# Patient Record
Sex: MALE | Race: BLACK OR AFRICAN AMERICAN | NOT HISPANIC OR LATINO
[De-identification: names, ages, dates, MRNs, and addresses within clinical notes are randomized per-mention and may not be internally consistent; named-entity substitution may affect disease eponyms.]

---

## 2018-04-05 PROBLEM — Z00.00 ENCOUNTER FOR PREVENTIVE HEALTH EXAMINATION: Status: ACTIVE | Noted: 2018-04-05

## 2018-05-31 ENCOUNTER — APPOINTMENT (OUTPATIENT)
Dept: GASTROENTEROLOGY | Facility: CLINIC | Age: 53
End: 2018-05-31
Payer: MEDICAID

## 2018-05-31 ENCOUNTER — APPOINTMENT (OUTPATIENT)
Dept: GASTROENTEROLOGY | Facility: CLINIC | Age: 53
End: 2018-05-31

## 2018-05-31 VITALS
SYSTOLIC BLOOD PRESSURE: 154 MMHG | HEART RATE: 88 BPM | DIASTOLIC BLOOD PRESSURE: 98 MMHG | RESPIRATION RATE: 18 BRPM | BODY MASS INDEX: 43.1 KG/M2 | TEMPERATURE: 97.7 F | WEIGHT: 291 LBS | HEIGHT: 69 IN

## 2018-05-31 PROCEDURE — 99203 OFFICE O/P NEW LOW 30 MIN: CPT

## 2018-05-31 RX ORDER — LISINOPRIL 10 MG/1
10 TABLET ORAL
Refills: 0 | Status: ACTIVE | COMMUNITY

## 2018-08-16 ENCOUNTER — RX CHANGE (OUTPATIENT)
Age: 53
End: 2018-08-16

## 2018-08-16 DIAGNOSIS — Z12.11 ENCOUNTER FOR SCREENING FOR MALIGNANT NEOPLASM OF COLON: ICD-10-CM

## 2018-08-16 RX ORDER — POLYETHYLENE GLYCOL 3350 17 G/17G
17 POWDER, FOR SOLUTION ORAL
Qty: 238 | Refills: 0 | Status: ACTIVE | COMMUNITY
Start: 2018-08-16 | End: 1900-01-01

## 2019-05-14 ENCOUNTER — INPATIENT (INPATIENT)
Facility: HOSPITAL | Age: 54
LOS: 5 days | Discharge: HOME | End: 2019-05-20
Attending: INTERNAL MEDICINE | Admitting: INTERNAL MEDICINE
Payer: MEDICAID

## 2019-05-14 VITALS
HEART RATE: 71 BPM | RESPIRATION RATE: 20 BRPM | SYSTOLIC BLOOD PRESSURE: 151 MMHG | DIASTOLIC BLOOD PRESSURE: 91 MMHG | OXYGEN SATURATION: 98 % | TEMPERATURE: 98 F

## 2019-05-14 DIAGNOSIS — I48.91 UNSPECIFIED ATRIAL FIBRILLATION: ICD-10-CM

## 2019-05-14 LAB
ALBUMIN SERPL ELPH-MCNC: 4 G/DL — SIGNIFICANT CHANGE UP (ref 3.3–5)
ALP SERPL-CCNC: 52 U/L — SIGNIFICANT CHANGE UP (ref 40–120)
ALT FLD-CCNC: 26 U/L — SIGNIFICANT CHANGE UP (ref 4–41)
ANION GAP SERPL CALC-SCNC: 12 MMO/L — SIGNIFICANT CHANGE UP (ref 7–14)
AST SERPL-CCNC: 28 U/L — SIGNIFICANT CHANGE UP (ref 4–40)
B PERT DNA SPEC QL NAA+PROBE: NOT DETECTED — SIGNIFICANT CHANGE UP
BASE EXCESS BLDV CALC-SCNC: 4.3 MMOL/L — SIGNIFICANT CHANGE UP
BASOPHILS # BLD AUTO: 0.02 K/UL — SIGNIFICANT CHANGE UP (ref 0–0.2)
BASOPHILS NFR BLD AUTO: 0.3 % — SIGNIFICANT CHANGE UP (ref 0–2)
BILIRUB SERPL-MCNC: 0.6 MG/DL — SIGNIFICANT CHANGE UP (ref 0.2–1.2)
BLOOD GAS VENOUS - CREATININE: 1.15 MG/DL — SIGNIFICANT CHANGE UP (ref 0.5–1.3)
BLOOD GAS VENOUS - FIO2: 21 — SIGNIFICANT CHANGE UP
BUN SERPL-MCNC: 14 MG/DL — SIGNIFICANT CHANGE UP (ref 7–23)
C PNEUM DNA SPEC QL NAA+PROBE: NOT DETECTED — SIGNIFICANT CHANGE UP
CALCIUM SERPL-MCNC: 9.6 MG/DL — SIGNIFICANT CHANGE UP (ref 8.4–10.5)
CHLORIDE BLDV-SCNC: 107 MMOL/L — SIGNIFICANT CHANGE UP (ref 96–108)
CHLORIDE SERPL-SCNC: 105 MMOL/L — SIGNIFICANT CHANGE UP (ref 98–107)
CO2 SERPL-SCNC: 26 MMOL/L — SIGNIFICANT CHANGE UP (ref 22–31)
CREAT SERPL-MCNC: 1.1 MG/DL — SIGNIFICANT CHANGE UP (ref 0.5–1.3)
EOSINOPHIL # BLD AUTO: 0.03 K/UL — SIGNIFICANT CHANGE UP (ref 0–0.5)
EOSINOPHIL NFR BLD AUTO: 0.4 % — SIGNIFICANT CHANGE UP (ref 0–6)
FLUAV H1 2009 PAND RNA SPEC QL NAA+PROBE: NOT DETECTED — SIGNIFICANT CHANGE UP
FLUAV H1 RNA SPEC QL NAA+PROBE: NOT DETECTED — SIGNIFICANT CHANGE UP
FLUAV H3 RNA SPEC QL NAA+PROBE: NOT DETECTED — SIGNIFICANT CHANGE UP
FLUAV SUBTYP SPEC NAA+PROBE: NOT DETECTED — SIGNIFICANT CHANGE UP
FLUBV RNA SPEC QL NAA+PROBE: NOT DETECTED — SIGNIFICANT CHANGE UP
GAS PNL BLDV: 142 MMOL/L — SIGNIFICANT CHANGE UP (ref 136–146)
GLUCOSE BLDV-MCNC: 101 MG/DL — HIGH (ref 70–99)
GLUCOSE SERPL-MCNC: 101 MG/DL — HIGH (ref 70–99)
HADV DNA SPEC QL NAA+PROBE: NOT DETECTED — SIGNIFICANT CHANGE UP
HCO3 BLDV-SCNC: 26 MMOL/L — SIGNIFICANT CHANGE UP (ref 20–27)
HCOV PNL SPEC NAA+PROBE: SIGNIFICANT CHANGE UP
HCT VFR BLD CALC: 41.7 % — SIGNIFICANT CHANGE UP (ref 39–50)
HCT VFR BLDV CALC: 41.5 % — SIGNIFICANT CHANGE UP (ref 39–51)
HGB BLD-MCNC: 13 G/DL — SIGNIFICANT CHANGE UP (ref 13–17)
HGB BLDV-MCNC: 13.5 G/DL — SIGNIFICANT CHANGE UP (ref 13–17)
HMPV RNA SPEC QL NAA+PROBE: NOT DETECTED — SIGNIFICANT CHANGE UP
HPIV1 RNA SPEC QL NAA+PROBE: NOT DETECTED — SIGNIFICANT CHANGE UP
HPIV2 RNA SPEC QL NAA+PROBE: NOT DETECTED — SIGNIFICANT CHANGE UP
HPIV3 RNA SPEC QL NAA+PROBE: NOT DETECTED — SIGNIFICANT CHANGE UP
HPIV4 RNA SPEC QL NAA+PROBE: NOT DETECTED — SIGNIFICANT CHANGE UP
IMM GRANULOCYTES NFR BLD AUTO: 0.4 % — SIGNIFICANT CHANGE UP (ref 0–1.5)
LACTATE BLDV-MCNC: 1.6 MMOL/L — SIGNIFICANT CHANGE UP (ref 0.5–2)
LYMPHOCYTES # BLD AUTO: 2.32 K/UL — SIGNIFICANT CHANGE UP (ref 1–3.3)
LYMPHOCYTES # BLD AUTO: 30.5 % — SIGNIFICANT CHANGE UP (ref 13–44)
MCHC RBC-ENTMCNC: 26.6 PG — LOW (ref 27–34)
MCHC RBC-ENTMCNC: 31.2 % — LOW (ref 32–36)
MCV RBC AUTO: 85.3 FL — SIGNIFICANT CHANGE UP (ref 80–100)
MONOCYTES # BLD AUTO: 0.92 K/UL — HIGH (ref 0–0.9)
MONOCYTES NFR BLD AUTO: 12.1 % — SIGNIFICANT CHANGE UP (ref 2–14)
NEUTROPHILS # BLD AUTO: 4.29 K/UL — SIGNIFICANT CHANGE UP (ref 1.8–7.4)
NEUTROPHILS NFR BLD AUTO: 56.3 % — SIGNIFICANT CHANGE UP (ref 43–77)
NRBC # FLD: 0 K/UL — SIGNIFICANT CHANGE UP (ref 0–0)
PCO2 BLDV: 52 MMHG — HIGH (ref 41–51)
PH BLDV: 7.37 PH — SIGNIFICANT CHANGE UP (ref 7.32–7.43)
PLATELET # BLD AUTO: 164 K/UL — SIGNIFICANT CHANGE UP (ref 150–400)
PMV BLD: 9.7 FL — SIGNIFICANT CHANGE UP (ref 7–13)
PO2 BLDV: 30 MMHG — LOW (ref 35–40)
POTASSIUM BLDV-SCNC: 4.1 MMOL/L — SIGNIFICANT CHANGE UP (ref 3.4–4.5)
POTASSIUM SERPL-MCNC: 4.3 MMOL/L — SIGNIFICANT CHANGE UP (ref 3.5–5.3)
POTASSIUM SERPL-SCNC: 4.3 MMOL/L — SIGNIFICANT CHANGE UP (ref 3.5–5.3)
PROT SERPL-MCNC: 7.9 G/DL — SIGNIFICANT CHANGE UP (ref 6–8.3)
RBC # BLD: 4.89 M/UL — SIGNIFICANT CHANGE UP (ref 4.2–5.8)
RBC # FLD: 14.6 % — HIGH (ref 10.3–14.5)
RSV RNA SPEC QL NAA+PROBE: NOT DETECTED — SIGNIFICANT CHANGE UP
RV+EV RNA SPEC QL NAA+PROBE: NOT DETECTED — SIGNIFICANT CHANGE UP
SAO2 % BLDV: 49.3 % — LOW (ref 60–85)
SODIUM SERPL-SCNC: 143 MMOL/L — SIGNIFICANT CHANGE UP (ref 135–145)
TROPONIN T, HIGH SENSITIVITY: 44 NG/L — SIGNIFICANT CHANGE UP (ref ?–14)
TROPONIN T, HIGH SENSITIVITY: 47 NG/L — SIGNIFICANT CHANGE UP (ref ?–14)
WBC # BLD: 7.61 K/UL — SIGNIFICANT CHANGE UP (ref 3.8–10.5)
WBC # FLD AUTO: 7.61 K/UL — SIGNIFICANT CHANGE UP (ref 3.8–10.5)

## 2019-05-14 PROCEDURE — 71046 X-RAY EXAM CHEST 2 VIEWS: CPT | Mod: 26

## 2019-05-14 RX ORDER — ENOXAPARIN SODIUM 100 MG/ML
130 INJECTION SUBCUTANEOUS EVERY 12 HOURS
Refills: 0 | Status: DISCONTINUED | OUTPATIENT
Start: 2019-05-14 | End: 2019-05-15

## 2019-05-14 RX ORDER — IPRATROPIUM/ALBUTEROL SULFATE 18-103MCG
3 AEROSOL WITH ADAPTER (GRAM) INHALATION ONCE
Refills: 0 | Status: COMPLETED | OUTPATIENT
Start: 2019-05-14 | End: 2019-05-14

## 2019-05-14 RX ADMIN — ENOXAPARIN SODIUM 130 MILLIGRAM(S): 100 INJECTION SUBCUTANEOUS at 21:13

## 2019-05-14 RX ADMIN — Medication 3 MILLILITER(S): at 16:59

## 2019-05-14 NOTE — ED PROVIDER NOTE - ATTENDING CONTRIBUTION TO CARE
Gong: I have seen and examined the patient face to face, have reviewed and addended the HPI, PE and a/p as necessary.     54 year old HTN HLD obesity sent from [facility] for worsening shortness of breath.  Patient arrives with ekg showing atrial fibrillation.  Pt reports having increasing sob on exertion with productive cough.  Pt states this is all because of his allergies.  Reports now only able to tolerate walking 15 feet /10 steps before getting short of breath. Denies chest pain.  Reports having phlegm.  No family hx of MI. No recent travel, surgeries, hospitalizations, le edema, calf pain, hx of dvt/pe. Last cardiac work up many years ago. Former smoker. No fever, chills, cp, palpitations, abd pain, n/v/d, dysuria.   As per [facility] documentation- no hx of a-fib, ekg from office with a-fib.    GEN - NAD; well appearing; A+O x3; non-toxic appearing, no respiratory distress at rest.    CARD - irregular rhythm, rate controlled no M,G,R;   PULM - slight wheeze, symmetric breath sounds;   ABD:  +BS, ND, NT, soft, no guarding, no rebound, no masses;   BACK: no CVA tenderness, Normal  spine;   EXT: symmetric pulses, 2+ dp, capillary refill < 2 seconds, no clubbing, no cyanosis, no edema

## 2019-05-14 NOTE — ED ADULT NURSE NOTE - OBJECTIVE STATEMENT
patient received in bed . Alert & ox3. cardiac monitor placed.pt complains of shortness  of breath pt . evaluated by MD.No complains of chest  pain or abdominal pain noted at this time. Placed 20g angiocath Lt. AC., labs drawn and sent. Duoneb given as per order. patient will be waiting for results, further evaluation and disposition.  made comfortable.will continue to monitor.

## 2019-05-14 NOTE — ED PROVIDER NOTE - OBJECTIVE STATEMENT
55 y/o male with pmhx of HTN, HLD, obesity, sent from [facility] not sure exactly why. Pt c/o SUMMERS and productive cough x 5 days. Associated with nasal congestion, states his allergies are worse, his wife was also sick with similar symptoms 2 weeks ago. Ambulates with cane at baseline, unable to walk more than 10 steps without getting SOB. Arrived via ambulance. Denies hx of heart problems. No family hx of MI. No recent travel, surgeries, hospitalizations, le edema, calf pain, hx of dvt/pe. Last cardiac work up many years ago. Former smoker. No fever, chills, cp, palpitations, abd pain, n/v/d, dysuria.   As per [facility] documentation- no hx of a-fib, ekg from office with a-fib.

## 2019-05-14 NOTE — ED ADULT TRIAGE NOTE - CHIEF COMPLAINT QUOTE
From [facility] by [facility] with SOB x 3 with non prod. cough. denies CP/dizziness no fever. pt walks with cane.

## 2019-05-14 NOTE — ED PROVIDER NOTE - PROGRESS NOTE DETAILS
left message for Dr. Clemente Vale for admission at [phone] spoke with dr. lamar tele doc, suggesting to admit to Dr. Samia Zeng (will let him know), also suggesting lovenox for ppx for new onset afib. weight documented. spoke with pharmacy regarding dosing. tele PA paged

## 2019-05-14 NOTE — ED PROVIDER NOTE - CLINICAL SUMMARY MEDICAL DECISION MAKING FREE TEXT BOX
55 y/o male with pmhx of HTN, HLD, obesity, sent from [facility] for new onset a-fib, rate controlled. Pt c/o nasal congestion, SUMMERS and productive cough x 5 days. wheezing on exam. sick contact at home. possible onset of afib secondary to viral syndrome- admit for cardiac work up. labs, troponin, proBNP. cxr.

## 2019-05-15 DIAGNOSIS — R91.1 SOLITARY PULMONARY NODULE: ICD-10-CM

## 2019-05-15 DIAGNOSIS — R06.09 OTHER FORMS OF DYSPNEA: ICD-10-CM

## 2019-05-15 DIAGNOSIS — I26.99 OTHER PULMONARY EMBOLISM WITHOUT ACUTE COR PULMONALE: ICD-10-CM

## 2019-05-15 DIAGNOSIS — I10 ESSENTIAL (PRIMARY) HYPERTENSION: ICD-10-CM

## 2019-05-15 DIAGNOSIS — Z29.9 ENCOUNTER FOR PROPHYLACTIC MEASURES, UNSPECIFIED: ICD-10-CM

## 2019-05-15 DIAGNOSIS — E78.5 HYPERLIPIDEMIA, UNSPECIFIED: ICD-10-CM

## 2019-05-15 DIAGNOSIS — D50.9 IRON DEFICIENCY ANEMIA, UNSPECIFIED: ICD-10-CM

## 2019-05-15 DIAGNOSIS — I48.91 UNSPECIFIED ATRIAL FIBRILLATION: ICD-10-CM

## 2019-05-15 LAB
ANION GAP SERPL CALC-SCNC: 13 MMO/L — SIGNIFICANT CHANGE UP (ref 7–14)
APTT BLD: 37.9 SEC — HIGH (ref 27.5–36.3)
BUN SERPL-MCNC: 12 MG/DL — SIGNIFICANT CHANGE UP (ref 7–23)
CALCIUM SERPL-MCNC: 9.5 MG/DL — SIGNIFICANT CHANGE UP (ref 8.4–10.5)
CHLORIDE SERPL-SCNC: 103 MMOL/L — SIGNIFICANT CHANGE UP (ref 98–107)
CHOLEST SERPL-MCNC: 154 MG/DL — SIGNIFICANT CHANGE UP (ref 120–199)
CO2 SERPL-SCNC: 25 MMOL/L — SIGNIFICANT CHANGE UP (ref 22–31)
CREAT SERPL-MCNC: 1.04 MG/DL — SIGNIFICANT CHANGE UP (ref 0.5–1.3)
GLUCOSE SERPL-MCNC: 97 MG/DL — SIGNIFICANT CHANGE UP (ref 70–99)
HBA1C BLD-MCNC: 5.9 % — HIGH (ref 4–5.6)
HCT VFR BLD CALC: 43.6 % — SIGNIFICANT CHANGE UP (ref 39–50)
HDLC SERPL-MCNC: 37 MG/DL — SIGNIFICANT CHANGE UP (ref 35–55)
HGB BLD-MCNC: 13.2 G/DL — SIGNIFICANT CHANGE UP (ref 13–17)
INR BLD: 1.36 — HIGH (ref 0.88–1.17)
LIPID PNL WITH DIRECT LDL SERPL: 109 MG/DL — SIGNIFICANT CHANGE UP
MAGNESIUM SERPL-MCNC: 2.2 MG/DL — SIGNIFICANT CHANGE UP (ref 1.6–2.6)
MCHC RBC-ENTMCNC: 26.1 PG — LOW (ref 27–34)
MCHC RBC-ENTMCNC: 30.3 % — LOW (ref 32–36)
MCV RBC AUTO: 86.3 FL — SIGNIFICANT CHANGE UP (ref 80–100)
NRBC # FLD: 0 K/UL — SIGNIFICANT CHANGE UP (ref 0–0)
NT-PROBNP SERPL-SCNC: 2024 PG/ML — SIGNIFICANT CHANGE UP
PLATELET # BLD AUTO: 161 K/UL — SIGNIFICANT CHANGE UP (ref 150–400)
PMV BLD: 9.6 FL — SIGNIFICANT CHANGE UP (ref 7–13)
POTASSIUM SERPL-MCNC: 4 MMOL/L — SIGNIFICANT CHANGE UP (ref 3.5–5.3)
POTASSIUM SERPL-SCNC: 4 MMOL/L — SIGNIFICANT CHANGE UP (ref 3.5–5.3)
PROTHROM AB SERPL-ACNC: 15.2 SEC — HIGH (ref 9.8–13.1)
RBC # BLD: 5.05 M/UL — SIGNIFICANT CHANGE UP (ref 4.2–5.8)
RBC # FLD: 14.6 % — HIGH (ref 10.3–14.5)
SODIUM SERPL-SCNC: 141 MMOL/L — SIGNIFICANT CHANGE UP (ref 135–145)
TRIGL SERPL-MCNC: 99 MG/DL — SIGNIFICANT CHANGE UP (ref 10–149)
WBC # BLD: 7.18 K/UL — SIGNIFICANT CHANGE UP (ref 3.8–10.5)
WBC # FLD AUTO: 7.18 K/UL — SIGNIFICANT CHANGE UP (ref 3.8–10.5)

## 2019-05-15 PROCEDURE — 93970 EXTREMITY STUDY: CPT | Mod: 26

## 2019-05-15 PROCEDURE — 71275 CT ANGIOGRAPHY CHEST: CPT | Mod: 26

## 2019-05-15 PROCEDURE — 93306 TTE W/DOPPLER COMPLETE: CPT | Mod: 26

## 2019-05-15 RX ORDER — LISINOPRIL 2.5 MG/1
10 TABLET ORAL DAILY
Refills: 0 | Status: DISCONTINUED | OUTPATIENT
Start: 2019-05-15 | End: 2019-05-20

## 2019-05-15 RX ORDER — ATORVASTATIN CALCIUM 80 MG/1
1 TABLET, FILM COATED ORAL
Qty: 0 | Refills: 0 | DISCHARGE

## 2019-05-15 RX ORDER — FUROSEMIDE 40 MG
20 TABLET ORAL DAILY
Refills: 0 | Status: DISCONTINUED | OUTPATIENT
Start: 2019-05-15 | End: 2019-05-16

## 2019-05-15 RX ORDER — FERROUS GLUCONATE 100 %
1 POWDER (GRAM) MISCELLANEOUS
Qty: 0 | Refills: 0 | DISCHARGE

## 2019-05-15 RX ORDER — CARVEDILOL PHOSPHATE 80 MG/1
3.12 CAPSULE, EXTENDED RELEASE ORAL EVERY 12 HOURS
Refills: 0 | Status: DISCONTINUED | OUTPATIENT
Start: 2019-05-15 | End: 2019-05-20

## 2019-05-15 RX ORDER — ATORVASTATIN CALCIUM 80 MG/1
20 TABLET, FILM COATED ORAL AT BEDTIME
Refills: 0 | Status: DISCONTINUED | OUTPATIENT
Start: 2019-05-15 | End: 2019-05-20

## 2019-05-15 RX ORDER — ENOXAPARIN SODIUM 100 MG/ML
130 INJECTION SUBCUTANEOUS
Refills: 0 | Status: DISCONTINUED | OUTPATIENT
Start: 2019-05-15 | End: 2019-05-18

## 2019-05-15 RX ORDER — FERROUS SULFATE 325(65) MG
325 TABLET ORAL DAILY
Refills: 0 | Status: DISCONTINUED | OUTPATIENT
Start: 2019-05-15 | End: 2019-05-20

## 2019-05-15 RX ORDER — ENOXAPARIN SODIUM 100 MG/ML
40 INJECTION SUBCUTANEOUS DAILY
Refills: 0 | Status: DISCONTINUED | OUTPATIENT
Start: 2019-05-15 | End: 2019-05-15

## 2019-05-15 RX ORDER — ACETAMINOPHEN 500 MG
650 TABLET ORAL EVERY 6 HOURS
Refills: 0 | Status: DISCONTINUED | OUTPATIENT
Start: 2019-05-15 | End: 2019-05-20

## 2019-05-15 RX ORDER — ASPIRIN/CALCIUM CARB/MAGNESIUM 324 MG
81 TABLET ORAL DAILY
Refills: 0 | Status: DISCONTINUED | OUTPATIENT
Start: 2019-05-15 | End: 2019-05-20

## 2019-05-15 RX ORDER — LISINOPRIL 2.5 MG/1
1 TABLET ORAL
Qty: 0 | Refills: 0 | DISCHARGE

## 2019-05-15 RX ORDER — FERROUS SULFATE 325(65) MG
1 TABLET ORAL
Qty: 0 | Refills: 0 | DISCHARGE

## 2019-05-15 RX ADMIN — ATORVASTATIN CALCIUM 20 MILLIGRAM(S): 80 TABLET, FILM COATED ORAL at 21:30

## 2019-05-15 RX ADMIN — CARVEDILOL PHOSPHATE 3.12 MILLIGRAM(S): 80 CAPSULE, EXTENDED RELEASE ORAL at 17:03

## 2019-05-15 RX ADMIN — Medication 81 MILLIGRAM(S): at 14:26

## 2019-05-15 RX ADMIN — LISINOPRIL 10 MILLIGRAM(S): 2.5 TABLET ORAL at 14:26

## 2019-05-15 RX ADMIN — Medication 325 MILLIGRAM(S): at 14:26

## 2019-05-15 RX ADMIN — ENOXAPARIN SODIUM 130 MILLIGRAM(S): 100 INJECTION SUBCUTANEOUS at 17:03

## 2019-05-15 NOTE — CONSULT NOTE ADULT - ASSESSMENT
SOB   has mild cough  SUMMERS  ? acute CHF vs pulm pathology   obtain CTPA to rule out PE and eval lung parenchyma   obtain echo  check proBNP    HTN  narrow pulse pressure may suggest decline in EF   start low dose BB and lasix 20 daily for now   obtain ECHO    abnormal EKG  not suggestive of afib  sinus with APCS  dc a/c   echo

## 2019-05-15 NOTE — H&P ADULT - RS GEN PE MLT RESP DETAILS PC
respirations non-labored/airway patent/breath sounds equal/no chest wall tenderness/good air movement/wheezes

## 2019-05-15 NOTE — H&P ADULT - NSHPSOCIALHISTORY_GEN_ALL_CORE
Marital Status:     Occupation: Retired, was self employed    Tobacco Use: d/c smoking 10 years ago, used to smoke 1 pack per day x 5 years    ETOH Use: neg    Flu Vaccine:       neg                           Pneumonia Vaccine: neg

## 2019-05-15 NOTE — H&P ADULT - PROBLEM SELECTOR PLAN 1
EKG/Telemetry, f/u ce x 2, mg, tsh, echo, consider ischemic w/u, cardio c/s Dr. Vale, RVP neg, check a probnp. start Lasix 20 iv daily as per Dr. Vale

## 2019-05-15 NOTE — H&P ADULT - ASSESSMENT
55 y/o male, with a PmHx of HTN, HLD, Anemia, Obesity, presented to the [facility] ED with SUMMERS and productive cough x  5 days. Admitted to telemetry for r/o acs.

## 2019-05-15 NOTE — CHART NOTE - NSCHARTNOTEFT_GEN_A_CORE
Tele PA Note    Called by Radiologist for patient having a bilateral PE with evidence of right heart strain on CTA chest. Findings were discussed with Dr. Vale. Pt placed back on therapeutic Lovenox. B/L LE US put in as well. Called the Echo department to expedite the echo. Findings were also discussed with patient. Will follow closely. He appears comfortable at this time.    Leonides Morales PA-C  Tele PA   [phone]

## 2019-05-15 NOTE — CONSULT NOTE ADULT - ASSESSMENT
55 y/o male, with a PmHx of HTN, HLD, Anemia, Obesity, presented to the [facility] ED with SUMMERS and productive cough x  5 days. Admitted to telemetry for r/o acs.

## 2019-05-15 NOTE — H&P ADULT - HISTORY OF PRESENT ILLNESS
53 y/o male, with a PmHx of HTN, HLD, Anemia, Obesity, presented to the [facility] ED with SUMMERS and productive cough x  5 days. Pt states about 5 days ago he started to get nasal congestion, cough, sinus pressure and was feeling tired. He states his wife was sick 2 weeks ago with similar symptoms so he thought he was getting what she had. After a few days of having these symptoms, he had gone to his PCP's office yesterday for an evaluation. While in his PCP's office, an EKG was done and appeared to be in new onset rate controlled afib so he was sent to the ed for an evaluation. He denies any fever, chills, chest pain, HA, blurred vision, n/v, abd pain, recent travel. He stated he was also having some dizziness. While in the ED, pt was started on therapeutic Lovenox x 1 dose and then admitted to telemetry for ? new Afib. While still in the ED, pt was seen by Dr. Vale (cardiologist) and determined the EKG was not Afib but was NSR with frequent PAC's and PVC's. Pt appears comfortable at this time.

## 2019-05-15 NOTE — H&P ADULT - PROBLEM SELECTOR PLAN 2
? afib, P waves present and appears to be PVC's and PACs that are frequent and not afib, as per Dr. Vale d/c therapeutic Lovenox (can give the prophylaxis dose instead), start coreg 3.125mg po bid

## 2019-05-15 NOTE — H&P ADULT - NSHPPHYSICALEXAM_GEN_ALL_CORE
Vital Signs Last 24 Hrs  T(C): 36.6 (15 May 2019 07:14), Max: 37.1 (15 May 2019 01:49)  T(F): 97.8 (15 May 2019 07:14), Max: 98.8 (15 May 2019 01:49)  HR: 87 (15 May 2019 07:14) (71 - 101)  BP: 148/110 (15 May 2019 07:14) (104/96 - 163/107)  BP(mean): --  RR: 18 (15 May 2019 07:14) (18 - 20)  SpO2: 100% (15 May 2019 07:14) (95% - 100%)    EKG: NSR w/PAC's & PVC's @ 84

## 2019-05-15 NOTE — H&P ADULT - NEGATIVE OPHTHALMOLOGIC SYMPTOMS
no photophobia/no blurred vision R/no diplopia/no loss of vision R/no blurred vision L/no loss of vision L

## 2019-05-15 NOTE — CONSULT NOTE ADULT - PROBLEM SELECTOR RECOMMENDATION 9
SUMMERS related to PE : started on lovenox full therapeutic dose: There is some evidence of RV strain ct chest: But he is hemodynamically stable: Would check echocardiogram: dopplers ARE PENDING!

## 2019-05-16 LAB
ANION GAP SERPL CALC-SCNC: 13 MMO/L — SIGNIFICANT CHANGE UP (ref 7–14)
APTT BLD: 34.9 SEC — SIGNIFICANT CHANGE UP (ref 27.5–36.3)
BUN SERPL-MCNC: 14 MG/DL — SIGNIFICANT CHANGE UP (ref 7–23)
CALCIUM SERPL-MCNC: 9.2 MG/DL — SIGNIFICANT CHANGE UP (ref 8.4–10.5)
CHLORIDE SERPL-SCNC: 103 MMOL/L — SIGNIFICANT CHANGE UP (ref 98–107)
CO2 SERPL-SCNC: 24 MMOL/L — SIGNIFICANT CHANGE UP (ref 22–31)
CREAT SERPL-MCNC: 1.06 MG/DL — SIGNIFICANT CHANGE UP (ref 0.5–1.3)
GLUCOSE SERPL-MCNC: 101 MG/DL — HIGH (ref 70–99)
HCT VFR BLD CALC: 40.2 % — SIGNIFICANT CHANGE UP (ref 39–50)
HGB BLD-MCNC: 12.3 G/DL — LOW (ref 13–17)
INR BLD: 1.31 — HIGH (ref 0.88–1.17)
MCHC RBC-ENTMCNC: 26.3 PG — LOW (ref 27–34)
MCHC RBC-ENTMCNC: 30.6 % — LOW (ref 32–36)
MCV RBC AUTO: 85.9 FL — SIGNIFICANT CHANGE UP (ref 80–100)
NRBC # FLD: 0 K/UL — SIGNIFICANT CHANGE UP (ref 0–0)
PLATELET # BLD AUTO: 169 K/UL — SIGNIFICANT CHANGE UP (ref 150–400)
PMV BLD: 9.7 FL — SIGNIFICANT CHANGE UP (ref 7–13)
POTASSIUM SERPL-MCNC: 3.8 MMOL/L — SIGNIFICANT CHANGE UP (ref 3.5–5.3)
POTASSIUM SERPL-SCNC: 3.8 MMOL/L — SIGNIFICANT CHANGE UP (ref 3.5–5.3)
PROTHROM AB SERPL-ACNC: 14.7 SEC — HIGH (ref 9.8–13.1)
RBC # BLD: 4.68 M/UL — SIGNIFICANT CHANGE UP (ref 4.2–5.8)
RBC # FLD: 14.6 % — HIGH (ref 10.3–14.5)
SODIUM SERPL-SCNC: 140 MMOL/L — SIGNIFICANT CHANGE UP (ref 135–145)
WBC # BLD: 4.36 K/UL — SIGNIFICANT CHANGE UP (ref 3.8–10.5)
WBC # FLD AUTO: 4.36 K/UL — SIGNIFICANT CHANGE UP (ref 3.8–10.5)

## 2019-05-16 RX ORDER — FUROSEMIDE 40 MG
40 TABLET ORAL
Refills: 0 | Status: DISCONTINUED | OUTPATIENT
Start: 2019-05-16 | End: 2019-05-19

## 2019-05-16 RX ADMIN — ATORVASTATIN CALCIUM 20 MILLIGRAM(S): 80 TABLET, FILM COATED ORAL at 21:09

## 2019-05-16 RX ADMIN — Medication 81 MILLIGRAM(S): at 09:02

## 2019-05-16 RX ADMIN — ENOXAPARIN SODIUM 130 MILLIGRAM(S): 100 INJECTION SUBCUTANEOUS at 17:14

## 2019-05-16 RX ADMIN — LISINOPRIL 10 MILLIGRAM(S): 2.5 TABLET ORAL at 06:18

## 2019-05-16 RX ADMIN — CARVEDILOL PHOSPHATE 3.12 MILLIGRAM(S): 80 CAPSULE, EXTENDED RELEASE ORAL at 17:14

## 2019-05-16 RX ADMIN — ENOXAPARIN SODIUM 130 MILLIGRAM(S): 100 INJECTION SUBCUTANEOUS at 06:17

## 2019-05-16 RX ADMIN — Medication 20 MILLIGRAM(S): at 06:18

## 2019-05-16 RX ADMIN — Medication 325 MILLIGRAM(S): at 09:02

## 2019-05-16 RX ADMIN — CARVEDILOL PHOSPHATE 3.12 MILLIGRAM(S): 80 CAPSULE, EXTENDED RELEASE ORAL at 06:18

## 2019-05-16 RX ADMIN — Medication 40 MILLIGRAM(S): at 17:14

## 2019-05-16 NOTE — CONSULT NOTE ADULT - SUBJECTIVE AND OBJECTIVE BOX
Patient is a 54y old  Male who presents with a chief complaint of SOB/Nasal Congestion (15 May 2019 08:00)      HPI:  55 y/o male, with a PmHx of HTN, HLD, Anemia, Obesity, presented to the [facility] ED with SUMMERS and productive cough x  5 days. Pt states about 5 days ago he started to get nasal congestion, cough, sinus pressure and was feeling tired. He states his wife was sick 2 weeks ago with similar symptoms so he thought he was getting what she had. After a few days of having these symptoms, he had gone to his PCP's office yesterday for an evaluation. While in his PCP's office, an EKG was done and appeared to be in new onset rate controlled afib so he was sent to the ed for an evaluation. He denies any fever, chills, chest pain, HA, blurred vision, n/v, abd pain, recent travel. He stated he was also having some dizziness. While in the ED, pt was started on therapeutic Lovenox x 1 dose and then admitted to telemetry for ? new Afib. While still in the ED, pt was seen by Dr. Vale (cardiologist) and determined the EKG was not Afib but was NSR with frequent PAC's and PVC's. Pt appears comfortable at this time. (15 May 2019 08:00)    events noted: pt says he has no underlying lung disease he does have sinus illness: and in january he was given antibiotics as well as some cough medications for sinus disease: He feels the same symptom in his sinuses now too:    todays he says he feels littel SOB but dneis ahving asthma or emphysema      ?FOLLOWING PRESENT  [x ] Hx of PE/DVT, [ x] Hx COPD, [ x] Hx of Asthma, [ x] Hx of Hospitalization, [x ]  Hx of BiPAP/CPAP use, [ x] Hx of TINA    Allergies    No Known Allergies    Intolerances        PAST MEDICAL & SURGICAL HISTORY:  Iron deficiency anemia  Obesity  Hyperlipidemia  Hypertension  No significant past surgical history      FAMILY HISTORY:  No pertinent family history in first degree relatives      Social History: [ 10 years: quit 28 years ago  ] TOBACCO                  [  x] ETOH                                 [ x ] IVDA/DRUGS    REVIEW OF SYSTEMS      General:	x    Skin/Breast:x  	  Ophthalmologic:x  	  ENMT:	sinus pressure, cough    Respiratory and Thorax:SOB  	  Cardiovascular:	x    Gastrointestinal:	x    Genitourinary:	x    Musculoskeletal:	x    Neurological:	x  Psychiatric:	  x  Hematology/Lymphatics:	x    Endocrine:	x    Allergic/Immunologic:	x    MEDICATIONS  (STANDING):  aspirin enteric coated 81 milliGRAM(s) Oral daily  atorvastatin 20 milliGRAM(s) Oral at bedtime  carvedilol 3.125 milliGRAM(s) Oral every 12 hours  enoxaparin Injectable 130 milliGRAM(s) SubCutaneous two times a day  ferrous    sulfate 325 milliGRAM(s) Oral daily  furosemide   Injectable 20 milliGRAM(s) IV Push daily  lisinopril 10 milliGRAM(s) Oral daily    MEDICATIONS  (PRN):  acetaminophen   Tablet .. 650 milliGRAM(s) Oral every 6 hours PRN Temp greater or equal to 38C (100.4F), Mild Pain (1 - 3), Moderate Pain (4 - 6)       Vital Signs Last 24 Hrs  T(C): 36.9 (15 May 2019 11:30), Max: 37.1 (15 May 2019 01:49)  T(F): 98.5 (15 May 2019 11:30), Max: 98.8 (15 May 2019 01:49)  HR: 85 (15 May 2019 11:30) (71 - 101)  BP: 142/95 (15 May 2019 11:30) (104/96 - 163/107)  BP(mean): --  RR: 18 (15 May 2019 11:30) (18 - 20)  SpO2: 97% (15 May 2019 11:30) (95% - 100%)        I&O's Summary      Physical Exam:   GENERAL: NAD, well-groomed, well-developed  HEENT: FÁTIMA/   Atraumatic, Normocephalic  ENMT: No tonsillar erythema, exudates, or enlargement; Moist mucous membranes, Good dentition, No lesions  NECK: Supple, No JVD, Normal thyroid  CHEST/LUNG: obese  CVS: Regular rate and rhythm; No murmurs, rubs, or gallops  GI: : Soft, Nontender, Nondistended; Bowel sounds present  NERVOUS SYSTEM:  Alert & Oriented X3  EXTREMITIES: -edema  LYMPH: No lymphadenopathy noted  SKIN: No rashes or lesions  ENDOCRINOLOGY: No Thyromegaly  PSYCH: Appropriate    Labs:  4.3<52<4>>30<<7.375>>4.3<<3><<4><<5<<309>>                            13.2   7.18  )-----------( 161      ( 15 May 2019 10:45 )             43.6                         13.0   7.61  )-----------( 164      ( 14 May 2019 17:15 )             41.7     05-14    143  |  105  |  14  ----------------------------<  101<H>  4.3   |  26  |  1.10    Ca    9.6      14 May 2019 17:15    TPro  7.9  /  Alb  4.0  /  TBili  0.6  /  DBili  x   /  AST  28  /  ALT  26  /  AlkPhos  52  05-14    CAPILLARY BLOOD GLUCOSE        LIVER FUNCTIONS - ( 14 May 2019 17:15 )  Alb: 4.0 g/dL / Pro: 7.9 g/dL / ALK PHOS: 52 u/L / ALT: 26 u/L / AST: 28 u/L / GGT: x           PT/INR - ( 15 May 2019 10:45 )   PT: 15.2 SEC;   INR: 1.36          PTT - ( 15 May 2019 10:45 )  PTT:37.9 SEC    D DImer      Studies  Chest X-RAY  CT SCAN Chest   CT Abdomen  Venous Dopplers: LE:   Others      < from: CT Angio Chest w/ IV Cont (05.15.19 @ 09:12) >  FINDINGS:     Pulmonary Artery: The main pulmonary artery is normal in caliber at the   level of bifurcation. Acute pulmonary embolus involving all 5 lobes from   distal bilateral main pulmonary arteries to the subsegmental branches.    A 5 mm pulmonary nodule in the right lower lobe (3:69).    Lungs And Airways: Central airways are patent. The lungs are clear.    Pleura: No pleural effusions or pneumothorax.    Mediastinum: No mediastinal lymphadenopathy. The imaged portions of the   thyroid gland is unremarkable.    Heart and Vasculature:  No pericardial effusion. There is subtle   flattening of the interventricular septum with some prominence of the   right heart chambers concerning for right heart strain.    Upper Abdomen: Small Bochdalek and hiatal hernia.    Bones And Soft Tissues: Degenerative change of the spine.    IMPRESSION:     Acute bilateral pulmonary emboli with suggestion ofright heart strain.    Dr. Montano discussed these findings with AMANDO Brown on 5/15/2019 9:34   AM, with read back.    5 mm right lower lobe pulmonary nodule. A 6-12 month follow-up chest CT   can be performed for further evaluation as clinically warranted.              OC MONTANO M.D., RADIOLOGY RESIDENT  This document has been electronically signed.  BERTO GARCIA M.D. ATTENDING RADIOLOGIST    < end of copied text >
CHIEF COMPLAINT:Patient is a 54y old  Male who presents with a chief complaint of     HISTORY OF PRESENT ILLNESS:    54 male with history as below presents with sob with walking mild cough   no cp   no dizziness  no palpitation     PAST MEDICAL & SURGICAL HISTORY:  Obesity  Hyperlipidemia  Hypertension  No significant past surgical history          MEDICATIONS:  enoxaparin Injectable 130 milliGRAM(s) SubCutaneous every 12 hours        acetaminophen   Tablet .. 650 milliGRAM(s) Oral every 6 hours PRN            FAMILY HISTORY:      Non-contributory    SOCIAL HISTORY:    No tobacco, drugs or etoh    Allergies    No Known Allergies    Intolerances    	    REVIEW OF SYSTEMS:  as above  The rest of the 14 points ROS reviewed and except above they are unremarkable.        PHYSICAL EXAM:  T(C): 36.6 (05-15-19 @ 07:14), Max: 37.1 (05-15-19 @ 01:49)  HR: 87 (05-15-19 @ 07:14) (71 - 101)  BP: 148/110 (05-15-19 @ 07:14) (104/96 - 163/107)  RR: 18 (05-15-19 @ 07:14) (18 - 20)  SpO2: 100% (05-15-19 @ 07:14) (95% - 100%)  Wt(kg): --  I&O's Summary    JVP: Normal  Neck: supple  Lung: few crackles   CV: S1 S2 , Murmur:  Abd: soft  Ext: No edema  neuro: Awake / alert  Psych: flat affect  Skin: normal      LABS/DATA:    TELEMETRY: 	    ECG:  	   	  CARDIAC MARKERS:                                      13.0   7.61  )-----------( 164      ( 14 May 2019 17:15 )             41.7     05-14    143  |  105  |  14  ----------------------------<  101<H>  4.3   |  26  |  1.10    Ca    9.6      14 May 2019 17:15    TPro  7.9  /  Alb  4.0  /  TBili  0.6  /  DBili  x   /  AST  28  /  ALT  26  /  AlkPhos  52  05-14    proBNP:   Lipid Profile:   HgA1c:   TSH:
Patient is a 54y old  Male who presents with a chief complaint of SOB/Nasal Congestion (16 May 2019 13:03)      HPI:  53 y/o male, with a PmHx of HTN, HLD, Anemia, Obesity, presented to the [facility] ED with SUMMERS and productive cough x  5 days. Pt states about 5 days ago he started to get nasal congestion, cough, sinus pressure and was feeling tired. He states his wife was sick 2 weeks ago with similar symptoms so he thought he was getting what she had. After a few days of having these symptoms, he had gone to his PCP's office yesterday for an evaluation. While in his PCP's office, an EKG was done and appeared to be in new onset rate controlled afib so he was sent to the ed for an evaluation. He denies any fever, chills, chest pain, HA, blurred vision, n/v, abd pain, recent travel. He stated he was also having some dizziness. While in the ED, pt was started on therapeutic Lovenox x 1 dose and then admitted to telemetry for ? new Afib. While still in the ED, pt was seen by Dr. Vale (cardiologist) and determined the EKG was not Afib but was NSR with frequent PAC's and PVC's. Pt appears comfortable at this time. (15 May 2019 08:00)       ROS:  Negative except for:    PAST MEDICAL & SURGICAL HISTORY:  Iron deficiency anemia  Obesity  Hyperlipidemia  Hypertension  No significant past surgical history      SOCIAL HISTORY:    FAMILY HISTORY:  No pertinent family history in first degree relatives      MEDICATIONS  (STANDING):  aspirin enteric coated 81 milliGRAM(s) Oral daily  atorvastatin 20 milliGRAM(s) Oral at bedtime  carvedilol 3.125 milliGRAM(s) Oral every 12 hours  enoxaparin Injectable 130 milliGRAM(s) SubCutaneous two times a day  ferrous    sulfate 325 milliGRAM(s) Oral daily  furosemide   Injectable 40 milliGRAM(s) IV Push two times a day  lisinopril 10 milliGRAM(s) Oral daily    MEDICATIONS  (PRN):  acetaminophen   Tablet .. 650 milliGRAM(s) Oral every 6 hours PRN Temp greater or equal to 38C (100.4F), Mild Pain (1 - 3), Moderate Pain (4 - 6)      Allergies    No Known Allergies    Intolerances        Vital Signs Last 24 Hrs  T(C): 36.8 (16 May 2019 06:16), Max: 37.2 (15 May 2019 21:29)  T(F): 98.2 (16 May 2019 06:16), Max: 98.9 (15 May 2019 21:29)  HR: 69 (16 May 2019 06:16) (69 - 85)  BP: 137/91 (16 May 2019 06:16) (135/87 - 144/73)  BP(mean): --  RR: 16 (16 May 2019 06:16) (16 - 17)  SpO2: 97% (16 May 2019 06:16) (97% - 98%)    PHYSICAL EXAM  General: obese male, adult in NAD  HEENT:  anicteric sclera, pink conjunctiva  Neck: supple  CV: normal S1/S2 with no murmur rubs or gallops  Lungs: positive air movement b/l ant lungs,clear to auscultation, no wheezes, no rales  Abdomen: soft non-tender non-distended, no hepatosplenomegaly  Ext: no clubbing cyanosis or edema  Skin: no rashes and no petechiae  Neuro: alert and oriented X 4, no focal deficits      LABS:                          12.3   4.36  )-----------( 169      ( 16 May 2019 07:41 )             40.2         Mean Cell Volume : 85.9 fL  Mean Cell Hemoglobin : 26.3 pg  Mean Cell Hemoglobin Concentration : 30.6 %  Auto Neutrophil # : x  Auto Lymphocyte # : x  Auto Monocyte # : x  Auto Eosinophil # : x  Auto Basophil # : x  Auto Neutrophil % : x  Auto Lymphocyte % : x  Auto Monocyte % : x  Auto Eosinophil % : x  Auto Basophil % : x      05-16    140  |  103  |  14  ----------------------------<  101<H>  3.8   |  24  |  1.06    Ca    9.2      16 May 2019 07:41  Mg     2.2     05-15    TPro  7.9  /  Alb  4.0  /  TBili  0.6  /  DBili  x   /  AST  28  /  ALT  26  /  AlkPhos  52  05-14      PT/INR - ( 16 May 2019 07:41 )   PT: 14.7 SEC;   INR: 1.31          PTT - ( 16 May 2019 07:41 )  PTT:34.9 SEC      BLOOD SMEAR INTERPRETATION:       RADIOLOGY & ADDITIONAL STUDIES:  < from: CT Angio Chest w/ IV Cont (05.15.19 @ 09:12) >  EXAM:  CT ANGIO CHEST (W)AW IC        PROCEDURE DATE:  May 15 2019         INTERPRETATION:  INDICATION: Shortness of breath, cough. Evaluate for   pulmonary embolus.    TECHNIQUE: Unenhanced helical images were obtained of the chest. Coronal   and sagittal images were reconstructed.  Images were obtained after the   uneventful administration of 90 cc of nonionic intravenous contrast   (Omnipaque 350).  10 cc of nonionic intravenous contrast (Omnipaque 350)   was discarded. Maximum intensity projection images were generated.    COMPARISON: No prior CT chest. Chest radiograph from 5/14/2019.    FINDINGS:     Pulmonary Artery: The main pulmonary artery is normal in caliber at the   level of bifurcation. Acute pulmonary embolus involving all 5 lobes from   distal bilateral main pulmonary arteries to the subsegmental branches.    A 5 mm pulmonary nodule in the right lower lobe (3:69).    Lungs And Airways: Central airways are patent. The lungs are clear.    Pleura: No pleural effusions or pneumothorax.    Mediastinum: No mediastinal lymphadenopathy. The imaged portions of the   thyroid gland is unremarkable.    Heart and Vasculature:  No pericardial effusion. There is subtle   flattening of the interventricular septum with some prominence of the   right heart chambers concerning for right heart strain.    Upper Abdomen: Small Bochdalek and hiatal hernia.    Bones And Soft Tissues: Degenerative change of the spine.    IMPRESSION:     Acute bilateral pulmonary emboli with suggestion ofright heart strain.    Dr. Montano discussed these findings with AMANDO Brown on 5/15/2019 9:34   AM, with read back.    5 mm right lower lobe pulmonary nodule. A 6-12 month follow-up chest CT   can be performed for further evaluation as clinically warranted.    < from: [facility] Duplex Ext Veins Lower Comp, Bilat. (05.15.19 @ 12:02) >  Type of Test: Lower Extremity Venous  ------------------------------------------------------------------------  Procedure: Real-time grayscale and color Duplex  ultrasonography was used to interrogate the deep veins of  the bilateral lower extremities.  Indications: Localized swelling, mass and lump, lower  limb, bilateral (R22.43)  ------------------------------------------------------------------------  RESULT:  ------------------------------------------------------------------------  RIGHT:  Deep venous thrombosis: Yes  Superficial venous thrombosis: No  Deep venous insufficiency: No  Superficial venous insufficiency: No  ------------------------------------------------------------------------  LEFT:  Deep venous thrombosis: No  Superficial venous thrombosis: No  Deep venous insufficiency: No  Superficial venous insufficiency: No  ------------------------------------------------------------------------  Right Findings: Non-occlusive thrombosis noted in the  right popliteal vein, which appears dilated with loss of  full compressibility.  The right common femoral, femoral, gastrocnemius,  posterior tibial, and peroneal veins, otherwise appear  sonographically normal and compressible without evidence  of thrombosis.  Left Findings: No evidence of thrombosis in the left lower  extremity.  The left common femoral, femoral, popliteal,  gastrocnemius, posterior tibial, and peroneal veins appear  sonographically normal and compressible without evidence  of thrombosis.  Normal phasic Doppler waveforms notedin the left common  femoral vein.  No evidence of superficial vein thrombosis.  The left  great saphenous and short saphenous veins are patent, and  demonstrate full compressibility.  No evidence of deep and superficial venous insufficiency  in the left lower extremity.  Normal valve closure times  (VCT) with proximal and distal augmentation noted within  the common femoral, femoral, popliteal, great saphenous,  and short saphenous veins.  ------------------------------------------------------------------------  Summary/Impressions:  Non-occlusive deep vein thrombosis noted in the right  popliteal vein.  Results communicated with AMANDO Morales on 05/15/2019 at  12:45 pm with read back.  ------------------------------------------------------------------------  Confirmed on  5/15/2019 - 12:57 PM by Mark Lanier MD,    < end of copied text >    <

## 2019-05-16 NOTE — CONSULT NOTE ADULT - ASSESSMENT
55 yo obese black male with new pulmonary emboli and DVT. No clear precipitating factor although obesity would be a risk factor. No family hx of clotting or bleeding events. Will proceed with a hypercoagulable work up. 55 yo obese black male with new pulmonary emboli and DVT. No clear precipitating factor although obesity would be a risk factor. No family hx of clotting or bleeding events. Will proceed with a hypercoagulable work up.    Considering a nodule in lung noted on chest CT, would obtain an abdominal CT , check PSA and CEA to r/o an occult malignancy.

## 2019-05-17 LAB
ANION GAP SERPL CALC-SCNC: 14 MMO/L — SIGNIFICANT CHANGE UP (ref 7–14)
APTT BLD: 44.3 SEC — HIGH (ref 27.5–36.3)
AT III ACT/NOR PPP CHRO: 82 % — SIGNIFICANT CHANGE UP (ref 76–140)
BASOPHILS # BLD AUTO: 0.03 K/UL — SIGNIFICANT CHANGE UP (ref 0–0.2)
BASOPHILS NFR BLD AUTO: 0.6 % — SIGNIFICANT CHANGE UP (ref 0–2)
BUN SERPL-MCNC: 20 MG/DL — SIGNIFICANT CHANGE UP (ref 7–23)
CALCIUM SERPL-MCNC: 9.3 MG/DL — SIGNIFICANT CHANGE UP (ref 8.4–10.5)
CEA SERPL-MCNC: 2.8 NG/ML — SIGNIFICANT CHANGE UP (ref 1–3.8)
CHLORIDE SERPL-SCNC: 101 MMOL/L — SIGNIFICANT CHANGE UP (ref 98–107)
CO2 SERPL-SCNC: 24 MMOL/L — SIGNIFICANT CHANGE UP (ref 22–31)
CREAT SERPL-MCNC: 1.16 MG/DL — SIGNIFICANT CHANGE UP (ref 0.5–1.3)
DRVVT SCREEN TO CONFIRM RATIO: 0.99 — SIGNIFICANT CHANGE UP (ref 0–1.2)
EOSINOPHIL # BLD AUTO: 0.1 K/UL — SIGNIFICANT CHANGE UP (ref 0–0.5)
EOSINOPHIL NFR BLD AUTO: 2 % — SIGNIFICANT CHANGE UP (ref 0–6)
GLUCOSE SERPL-MCNC: 105 MG/DL — HIGH (ref 70–99)
HCT VFR BLD CALC: 42.3 % — SIGNIFICANT CHANGE UP (ref 39–50)
HCYS SERPL-MCNC: 17.6 UMOL/L — HIGH
HGB BLD-MCNC: 13.3 G/DL — SIGNIFICANT CHANGE UP (ref 13–17)
IMM GRANULOCYTES NFR BLD AUTO: 0.6 % — SIGNIFICANT CHANGE UP (ref 0–1.5)
INR BLD: 1.22 — HIGH (ref 0.88–1.17)
LYMPHOCYTES # BLD AUTO: 1.92 K/UL — SIGNIFICANT CHANGE UP (ref 1–3.3)
LYMPHOCYTES # BLD AUTO: 38.9 % — SIGNIFICANT CHANGE UP (ref 13–44)
MAGNESIUM SERPL-MCNC: 1.9 MG/DL — SIGNIFICANT CHANGE UP (ref 1.6–2.6)
MCHC RBC-ENTMCNC: 26.9 PG — LOW (ref 27–34)
MCHC RBC-ENTMCNC: 31.4 % — LOW (ref 32–36)
MCV RBC AUTO: 85.6 FL — SIGNIFICANT CHANGE UP (ref 80–100)
MONOCYTES # BLD AUTO: 0.7 K/UL — SIGNIFICANT CHANGE UP (ref 0–0.9)
MONOCYTES NFR BLD AUTO: 14.2 % — HIGH (ref 2–14)
NEUTROPHILS # BLD AUTO: 2.16 K/UL — SIGNIFICANT CHANGE UP (ref 1.8–7.4)
NEUTROPHILS NFR BLD AUTO: 43.7 % — SIGNIFICANT CHANGE UP (ref 43–77)
NORMALIZED SCT PPP-RTO: 1.02 — SIGNIFICANT CHANGE UP (ref 0.85–1.33)
NRBC # FLD: 0 K/UL — SIGNIFICANT CHANGE UP (ref 0–0)
PLATELET # BLD AUTO: 176 K/UL — SIGNIFICANT CHANGE UP (ref 150–400)
PMV BLD: 10.2 FL — SIGNIFICANT CHANGE UP (ref 7–13)
POTASSIUM SERPL-MCNC: 3.9 MMOL/L — SIGNIFICANT CHANGE UP (ref 3.5–5.3)
POTASSIUM SERPL-SCNC: 3.9 MMOL/L — SIGNIFICANT CHANGE UP (ref 3.5–5.3)
PROT C ACT/NOR PPP: 84 % — SIGNIFICANT CHANGE UP (ref 74–150)
PROTHROM AB SERPL-ACNC: 14 SEC — HIGH (ref 9.8–13.1)
PSA FLD-MCNC: 0.99 NG/ML — SIGNIFICANT CHANGE UP (ref 0–4)
RBC # BLD: 4.94 M/UL — SIGNIFICANT CHANGE UP (ref 4.2–5.8)
RBC # FLD: 14.5 % — SIGNIFICANT CHANGE UP (ref 10.3–14.5)
SODIUM SERPL-SCNC: 139 MMOL/L — SIGNIFICANT CHANGE UP (ref 135–145)
THROMBIN TIME: 91.7 SEC — HIGH (ref 16–25)
WBC # BLD: 4.94 K/UL — SIGNIFICANT CHANGE UP (ref 3.8–10.5)
WBC # FLD AUTO: 4.94 K/UL — SIGNIFICANT CHANGE UP (ref 3.8–10.5)

## 2019-05-17 PROCEDURE — 74177 CT ABD & PELVIS W/CONTRAST: CPT | Mod: 26

## 2019-05-17 PROCEDURE — G0452: CPT | Mod: 26

## 2019-05-17 RX ADMIN — Medication 81 MILLIGRAM(S): at 13:02

## 2019-05-17 RX ADMIN — ATORVASTATIN CALCIUM 20 MILLIGRAM(S): 80 TABLET, FILM COATED ORAL at 22:37

## 2019-05-17 RX ADMIN — CARVEDILOL PHOSPHATE 3.12 MILLIGRAM(S): 80 CAPSULE, EXTENDED RELEASE ORAL at 17:20

## 2019-05-17 RX ADMIN — Medication 40 MILLIGRAM(S): at 17:18

## 2019-05-17 RX ADMIN — ENOXAPARIN SODIUM 130 MILLIGRAM(S): 100 INJECTION SUBCUTANEOUS at 17:21

## 2019-05-17 RX ADMIN — Medication 40 MILLIGRAM(S): at 05:08

## 2019-05-17 RX ADMIN — CARVEDILOL PHOSPHATE 3.12 MILLIGRAM(S): 80 CAPSULE, EXTENDED RELEASE ORAL at 05:09

## 2019-05-17 RX ADMIN — LISINOPRIL 10 MILLIGRAM(S): 2.5 TABLET ORAL at 05:09

## 2019-05-17 RX ADMIN — ENOXAPARIN SODIUM 130 MILLIGRAM(S): 100 INJECTION SUBCUTANEOUS at 05:09

## 2019-05-17 RX ADMIN — Medication 325 MILLIGRAM(S): at 13:02

## 2019-05-18 LAB
ANION GAP SERPL CALC-SCNC: 13 MMO/L — SIGNIFICANT CHANGE UP (ref 7–14)
BASOPHILS # BLD AUTO: 0.02 K/UL — SIGNIFICANT CHANGE UP (ref 0–0.2)
BASOPHILS NFR BLD AUTO: 0.4 % — SIGNIFICANT CHANGE UP (ref 0–2)
BUN SERPL-MCNC: 15 MG/DL — SIGNIFICANT CHANGE UP (ref 7–23)
CALCIUM SERPL-MCNC: 9.2 MG/DL — SIGNIFICANT CHANGE UP (ref 8.4–10.5)
CHLORIDE SERPL-SCNC: 98 MMOL/L — SIGNIFICANT CHANGE UP (ref 98–107)
CO2 SERPL-SCNC: 27 MMOL/L — SIGNIFICANT CHANGE UP (ref 22–31)
CREAT SERPL-MCNC: 0.99 MG/DL — SIGNIFICANT CHANGE UP (ref 0.5–1.3)
EOSINOPHIL # BLD AUTO: 0.09 K/UL — SIGNIFICANT CHANGE UP (ref 0–0.5)
EOSINOPHIL NFR BLD AUTO: 1.9 % — SIGNIFICANT CHANGE UP (ref 0–6)
FOLATE SERPL-MCNC: 9.9 NG/ML — SIGNIFICANT CHANGE UP (ref 4.7–20)
GLUCOSE SERPL-MCNC: 97 MG/DL — SIGNIFICANT CHANGE UP (ref 70–99)
HCT VFR BLD CALC: 41.5 % — SIGNIFICANT CHANGE UP (ref 39–50)
HGB BLD-MCNC: 13.2 G/DL — SIGNIFICANT CHANGE UP (ref 13–17)
IMM GRANULOCYTES NFR BLD AUTO: 0.4 % — SIGNIFICANT CHANGE UP (ref 0–1.5)
LYMPHOCYTES # BLD AUTO: 1.61 K/UL — SIGNIFICANT CHANGE UP (ref 1–3.3)
LYMPHOCYTES # BLD AUTO: 34.5 % — SIGNIFICANT CHANGE UP (ref 13–44)
MAGNESIUM SERPL-MCNC: 2 MG/DL — SIGNIFICANT CHANGE UP (ref 1.6–2.6)
MCHC RBC-ENTMCNC: 26.9 PG — LOW (ref 27–34)
MCHC RBC-ENTMCNC: 31.8 % — LOW (ref 32–36)
MCV RBC AUTO: 84.5 FL — SIGNIFICANT CHANGE UP (ref 80–100)
MONOCYTES # BLD AUTO: 0.72 K/UL — SIGNIFICANT CHANGE UP (ref 0–0.9)
MONOCYTES NFR BLD AUTO: 15.5 % — HIGH (ref 2–14)
NEUTROPHILS # BLD AUTO: 2.2 K/UL — SIGNIFICANT CHANGE UP (ref 1.8–7.4)
NEUTROPHILS NFR BLD AUTO: 47.3 % — SIGNIFICANT CHANGE UP (ref 43–77)
NRBC # FLD: 0 K/UL — SIGNIFICANT CHANGE UP (ref 0–0)
PLATELET # BLD AUTO: 167 K/UL — SIGNIFICANT CHANGE UP (ref 150–400)
PMV BLD: 9.1 FL — SIGNIFICANT CHANGE UP (ref 7–13)
POTASSIUM SERPL-MCNC: 3.8 MMOL/L — SIGNIFICANT CHANGE UP (ref 3.5–5.3)
POTASSIUM SERPL-SCNC: 3.8 MMOL/L — SIGNIFICANT CHANGE UP (ref 3.5–5.3)
RBC # BLD: 4.91 M/UL — SIGNIFICANT CHANGE UP (ref 4.2–5.8)
RBC # FLD: 14.6 % — HIGH (ref 10.3–14.5)
SODIUM SERPL-SCNC: 138 MMOL/L — SIGNIFICANT CHANGE UP (ref 135–145)
VIT B12 SERPL-MCNC: 637 PG/ML — SIGNIFICANT CHANGE UP (ref 200–900)
WBC # BLD: 4.66 K/UL — SIGNIFICANT CHANGE UP (ref 3.8–10.5)
WBC # FLD AUTO: 4.66 K/UL — SIGNIFICANT CHANGE UP (ref 3.8–10.5)

## 2019-05-18 RX ORDER — APIXABAN 2.5 MG/1
2 TABLET, FILM COATED ORAL
Qty: 12 | Refills: 0
Start: 2019-05-18 | End: 2019-05-20

## 2019-05-18 RX ORDER — APIXABAN 2.5 MG/1
10 TABLET, FILM COATED ORAL EVERY 12 HOURS
Refills: 0 | Status: DISCONTINUED | OUTPATIENT
Start: 2019-05-18 | End: 2019-05-20

## 2019-05-18 RX ORDER — FLUTICASONE PROPIONATE 50 MCG
1 SPRAY, SUSPENSION NASAL
Refills: 0 | Status: DISCONTINUED | OUTPATIENT
Start: 2019-05-18 | End: 2019-05-20

## 2019-05-18 RX ADMIN — Medication 325 MILLIGRAM(S): at 12:22

## 2019-05-18 RX ADMIN — APIXABAN 10 MILLIGRAM(S): 2.5 TABLET, FILM COATED ORAL at 18:03

## 2019-05-18 RX ADMIN — Medication 40 MILLIGRAM(S): at 17:36

## 2019-05-18 RX ADMIN — ENOXAPARIN SODIUM 130 MILLIGRAM(S): 100 INJECTION SUBCUTANEOUS at 05:48

## 2019-05-18 RX ADMIN — Medication 81 MILLIGRAM(S): at 12:22

## 2019-05-18 RX ADMIN — ATORVASTATIN CALCIUM 20 MILLIGRAM(S): 80 TABLET, FILM COATED ORAL at 21:10

## 2019-05-18 RX ADMIN — Medication 1 SPRAY(S): at 17:35

## 2019-05-18 RX ADMIN — CARVEDILOL PHOSPHATE 3.12 MILLIGRAM(S): 80 CAPSULE, EXTENDED RELEASE ORAL at 05:49

## 2019-05-18 RX ADMIN — Medication 40 MILLIGRAM(S): at 05:49

## 2019-05-18 RX ADMIN — CARVEDILOL PHOSPHATE 3.12 MILLIGRAM(S): 80 CAPSULE, EXTENDED RELEASE ORAL at 17:35

## 2019-05-18 RX ADMIN — LISINOPRIL 10 MILLIGRAM(S): 2.5 TABLET ORAL at 05:49

## 2019-05-19 ENCOUNTER — TRANSCRIPTION ENCOUNTER (OUTPATIENT)
Age: 54
End: 2019-05-19

## 2019-05-19 LAB
ANION GAP SERPL CALC-SCNC: 14 MMO/L — SIGNIFICANT CHANGE UP (ref 7–14)
BASOPHILS # BLD AUTO: 0.02 K/UL — SIGNIFICANT CHANGE UP (ref 0–0.2)
BASOPHILS NFR BLD AUTO: 0.4 % — SIGNIFICANT CHANGE UP (ref 0–2)
BUN SERPL-MCNC: 16 MG/DL — SIGNIFICANT CHANGE UP (ref 7–23)
CALCIUM SERPL-MCNC: 9.4 MG/DL — SIGNIFICANT CHANGE UP (ref 8.4–10.5)
CHLORIDE SERPL-SCNC: 101 MMOL/L — SIGNIFICANT CHANGE UP (ref 98–107)
CO2 SERPL-SCNC: 26 MMOL/L — SIGNIFICANT CHANGE UP (ref 22–31)
CREAT SERPL-MCNC: 1.05 MG/DL — SIGNIFICANT CHANGE UP (ref 0.5–1.3)
EOSINOPHIL # BLD AUTO: 0.08 K/UL — SIGNIFICANT CHANGE UP (ref 0–0.5)
EOSINOPHIL NFR BLD AUTO: 1.5 % — SIGNIFICANT CHANGE UP (ref 0–6)
GLUCOSE SERPL-MCNC: 87 MG/DL — SIGNIFICANT CHANGE UP (ref 70–99)
HCT VFR BLD CALC: 40.2 % — SIGNIFICANT CHANGE UP (ref 39–50)
HGB BLD-MCNC: 12.9 G/DL — LOW (ref 13–17)
IMM GRANULOCYTES NFR BLD AUTO: 0.4 % — SIGNIFICANT CHANGE UP (ref 0–1.5)
LYMPHOCYTES # BLD AUTO: 2.28 K/UL — SIGNIFICANT CHANGE UP (ref 1–3.3)
LYMPHOCYTES # BLD AUTO: 44 % — SIGNIFICANT CHANGE UP (ref 13–44)
MAGNESIUM SERPL-MCNC: 2.2 MG/DL — SIGNIFICANT CHANGE UP (ref 1.6–2.6)
MCHC RBC-ENTMCNC: 27.3 PG — SIGNIFICANT CHANGE UP (ref 27–34)
MCHC RBC-ENTMCNC: 32.1 % — SIGNIFICANT CHANGE UP (ref 32–36)
MCV RBC AUTO: 85 FL — SIGNIFICANT CHANGE UP (ref 80–100)
MONOCYTES # BLD AUTO: 0.83 K/UL — SIGNIFICANT CHANGE UP (ref 0–0.9)
MONOCYTES NFR BLD AUTO: 16 % — HIGH (ref 2–14)
NEUTROPHILS # BLD AUTO: 1.95 K/UL — SIGNIFICANT CHANGE UP (ref 1.8–7.4)
NEUTROPHILS NFR BLD AUTO: 37.7 % — LOW (ref 43–77)
NRBC # FLD: 0 K/UL — SIGNIFICANT CHANGE UP (ref 0–0)
PLATELET # BLD AUTO: 173 K/UL — SIGNIFICANT CHANGE UP (ref 150–400)
PMV BLD: 10.1 FL — SIGNIFICANT CHANGE UP (ref 7–13)
POTASSIUM SERPL-MCNC: 4.2 MMOL/L — SIGNIFICANT CHANGE UP (ref 3.5–5.3)
POTASSIUM SERPL-SCNC: 4.2 MMOL/L — SIGNIFICANT CHANGE UP (ref 3.5–5.3)
RBC # BLD: 4.73 M/UL — SIGNIFICANT CHANGE UP (ref 4.2–5.8)
RBC # FLD: 14.5 % — SIGNIFICANT CHANGE UP (ref 10.3–14.5)
SODIUM SERPL-SCNC: 141 MMOL/L — SIGNIFICANT CHANGE UP (ref 135–145)
WBC # BLD: 5.18 K/UL — SIGNIFICANT CHANGE UP (ref 3.8–10.5)
WBC # FLD AUTO: 5.18 K/UL — SIGNIFICANT CHANGE UP (ref 3.8–10.5)

## 2019-05-19 RX ORDER — FLUTICASONE PROPIONATE 50 MCG
1 SPRAY, SUSPENSION NASAL
Qty: 1 | Refills: 0
Start: 2019-05-19 | End: 2019-06-17

## 2019-05-19 RX ORDER — APIXABAN 2.5 MG/1
1 TABLET, FILM COATED ORAL
Qty: 60 | Refills: 3
Start: 2019-05-19 | End: 2019-09-15

## 2019-05-19 RX ORDER — FUROSEMIDE 40 MG
1 TABLET ORAL
Qty: 60 | Refills: 0
Start: 2019-05-19 | End: 2019-06-17

## 2019-05-19 RX ORDER — CARVEDILOL PHOSPHATE 80 MG/1
1 CAPSULE, EXTENDED RELEASE ORAL
Qty: 60 | Refills: 0
Start: 2019-05-19 | End: 2019-06-17

## 2019-05-19 RX ORDER — FUROSEMIDE 40 MG
40 TABLET ORAL
Refills: 0 | Status: DISCONTINUED | OUTPATIENT
Start: 2019-05-19 | End: 2019-05-20

## 2019-05-19 RX ORDER — ASPIRIN/CALCIUM CARB/MAGNESIUM 324 MG
1 TABLET ORAL
Qty: 0 | Refills: 0 | DISCHARGE
Start: 2019-05-19

## 2019-05-19 RX ADMIN — LISINOPRIL 10 MILLIGRAM(S): 2.5 TABLET ORAL at 05:29

## 2019-05-19 RX ADMIN — Medication 81 MILLIGRAM(S): at 11:49

## 2019-05-19 RX ADMIN — Medication 1 SPRAY(S): at 17:28

## 2019-05-19 RX ADMIN — Medication 40 MILLIGRAM(S): at 17:29

## 2019-05-19 RX ADMIN — Medication 1 SPRAY(S): at 05:29

## 2019-05-19 RX ADMIN — ATORVASTATIN CALCIUM 20 MILLIGRAM(S): 80 TABLET, FILM COATED ORAL at 20:23

## 2019-05-19 RX ADMIN — APIXABAN 10 MILLIGRAM(S): 2.5 TABLET, FILM COATED ORAL at 17:29

## 2019-05-19 RX ADMIN — Medication 325 MILLIGRAM(S): at 11:49

## 2019-05-19 RX ADMIN — CARVEDILOL PHOSPHATE 3.12 MILLIGRAM(S): 80 CAPSULE, EXTENDED RELEASE ORAL at 05:29

## 2019-05-19 RX ADMIN — CARVEDILOL PHOSPHATE 3.12 MILLIGRAM(S): 80 CAPSULE, EXTENDED RELEASE ORAL at 17:29

## 2019-05-19 RX ADMIN — Medication 40 MILLIGRAM(S): at 05:29

## 2019-05-19 RX ADMIN — APIXABAN 10 MILLIGRAM(S): 2.5 TABLET, FILM COATED ORAL at 05:30

## 2019-05-19 NOTE — DISCHARGE NOTE PROVIDER - HOSPITAL COURSE
HPI:    53 y/o male, with a PmHx of HTN, HLD, Anemia, Obesity, presented to the [facility] ED with SUMMERS and productive cough x  5 days. Pt states about 5 days ago he started to get nasal congestion, cough, sinus pressure and was feeling tired. He states his wife was sick 2 weeks ago with similar symptoms so he thought he was getting what she had. After a few days of having these symptoms, he had gone to his PCP's office yesterday for an evaluation. While in his PCP's office, an EKG was done and appeared to be in new onset rate controlled afib so he was sent to the ed for an evaluation. He denies any fever, chills, chest pain, HA, blurred vision, n/v, abd pain, recent travel. He stated he was also having some dizziness. While in the ED, pt was started on therapeutic Lovenox x 1 dose and then admitted to telemetry for ? new Afib. While still in the ED, pt was seen by Dr. Vale (cardiologist) and determined the EKG was not Afib but was NSR with frequent PAC's and PVC's. Pt appears comfortable at this time. (15 May 2019 08:00)        On admission EKG showed NSR @ 84 c PAC/PVC and serial troponin was 44 > 47. Chest x-ray was clear, RVP  negative, and BNP 2024. CTA of chest performed showing aute bilateral PE with possible right heart strai. A 5mm pulmonary nodule was seen and 6-12 month follow up recommended. LE Doppler showed right popliteal DVT. Echocardiogram performed showing EF 28% and severe pulmonary hypertension. For acute PE/DVT patient was treated with Lovenox and transitioned to Eliquis for longeterm anticoagulation. CT A/P was performed without evidence of malignancy. Hypercoagulable  work up was sent for patient and outpatient follow up with hematology advised. Patient received IV lasix for acute systolic CHF.  Ischemic work up indicated for new severe systolic LV dysfunction but due to acute PE deferred at this time.  Patient transitioned to oral oral lasix. Discharged home on ...

## 2019-05-19 NOTE — CHART NOTE - NSCHARTNOTEFT_GEN_A_CORE
Reviewed case with Dr. Case, patient cleared for discharge. Patient made aware he is cleared for discharge but currently refusing due to nasal congestion. Reviewed again with Dr. Case and case management called for 24 hour discharge notice.

## 2019-05-19 NOTE — DISCHARGE NOTE PROVIDER - NSDCCPCAREPLAN_GEN_ALL_CORE_FT
PRINCIPAL DISCHARGE DIAGNOSIS  Diagnosis: Acute pulmonary embolus  Assessment and Plan of Treatment: Please complete loading dose of Eliquis; 2 tabs  (total of 10mg) twice daily for 3 days (through 5/21/19) on 5/22/19 please decrease dose to 1 tab (5mg) twice daily. Please follow up with your doctors in 1-2 weeks. Information for Dr. Frost, hematologist, is provided for you to follow up as an outpatient to evaluate why pulmonary embolism developed.      SECONDARY DISCHARGE DIAGNOSES  Diagnosis: Acute systolic congestive heart failure  Assessment and Plan of Treatment: Information for Dr. Vale was provided, you should follow up with him in 2-3 weeks. Please take your medications as prescriebd. Further testing will be required as an outpatient including stress test or angiogram.    Diagnosis: Essential hypertension  Assessment and Plan of Treatment: Low sodium and fat diet, continue anti-hypertensive medications, and follow up with primary care physician.    Diagnosis: Pulmonary nodule, right  Assessment and Plan of Treatment: Please have your primary doctor schedule a 6 month repeat CT scan to evaluate for any changes in this nodule.

## 2019-05-19 NOTE — DISCHARGE NOTE PROVIDER - CARE PROVIDER_API CALL
Clemente Vale)  Cardiovascular Disease; Internal Medicine  [address]  [place] [address]  Phone: [phone]  Fax: [phone]  Follow Up Time:     Mati Frost)  Hematology; Internal Medicine; Medical Oncology  [address], [address] [facility] [facility] [address]  [place] [address]  Phone: [phone]  Fax: [phone]  Follow Up Time:

## 2019-05-20 ENCOUNTER — TRANSCRIPTION ENCOUNTER (OUTPATIENT)
Age: 54
End: 2019-05-20

## 2019-05-20 VITALS
SYSTOLIC BLOOD PRESSURE: 116 MMHG | DIASTOLIC BLOOD PRESSURE: 86 MMHG | OXYGEN SATURATION: 100 % | TEMPERATURE: 98 F | HEART RATE: 81 BPM | RESPIRATION RATE: 18 BRPM

## 2019-05-20 LAB
ANION GAP SERPL CALC-SCNC: 12 MMO/L — SIGNIFICANT CHANGE UP (ref 7–14)
BASOPHILS # BLD AUTO: 0.02 K/UL — SIGNIFICANT CHANGE UP (ref 0–0.2)
BASOPHILS NFR BLD AUTO: 0.4 % — SIGNIFICANT CHANGE UP (ref 0–2)
BUN SERPL-MCNC: 17 MG/DL — SIGNIFICANT CHANGE UP (ref 7–23)
CALCIUM SERPL-MCNC: 9.4 MG/DL — SIGNIFICANT CHANGE UP (ref 8.4–10.5)
CHLORIDE SERPL-SCNC: 98 MMOL/L — SIGNIFICANT CHANGE UP (ref 98–107)
CO2 SERPL-SCNC: 24 MMOL/L — SIGNIFICANT CHANGE UP (ref 22–31)
CREAT SERPL-MCNC: 0.99 MG/DL — SIGNIFICANT CHANGE UP (ref 0.5–1.3)
EOSINOPHIL # BLD AUTO: 0.05 K/UL — SIGNIFICANT CHANGE UP (ref 0–0.5)
EOSINOPHIL NFR BLD AUTO: 1 % — SIGNIFICANT CHANGE UP (ref 0–6)
GLUCOSE SERPL-MCNC: 95 MG/DL — SIGNIFICANT CHANGE UP (ref 70–99)
HCT VFR BLD CALC: 42.7 % — SIGNIFICANT CHANGE UP (ref 39–50)
HGB BLD-MCNC: 13.4 G/DL — SIGNIFICANT CHANGE UP (ref 13–17)
IMM GRANULOCYTES NFR BLD AUTO: 0.4 % — SIGNIFICANT CHANGE UP (ref 0–1.5)
LYMPHOCYTES # BLD AUTO: 1.59 K/UL — SIGNIFICANT CHANGE UP (ref 1–3.3)
LYMPHOCYTES # BLD AUTO: 30.2 % — SIGNIFICANT CHANGE UP (ref 13–44)
MAGNESIUM SERPL-MCNC: 2.1 MG/DL — SIGNIFICANT CHANGE UP (ref 1.6–2.6)
MCHC RBC-ENTMCNC: 26.8 PG — LOW (ref 27–34)
MCHC RBC-ENTMCNC: 31.4 % — LOW (ref 32–36)
MCV RBC AUTO: 85.4 FL — SIGNIFICANT CHANGE UP (ref 80–100)
MONOCYTES # BLD AUTO: 0.94 K/UL — HIGH (ref 0–0.9)
MONOCYTES NFR BLD AUTO: 17.9 % — HIGH (ref 2–14)
NEUTROPHILS # BLD AUTO: 2.64 K/UL — SIGNIFICANT CHANGE UP (ref 1.8–7.4)
NEUTROPHILS NFR BLD AUTO: 50.1 % — SIGNIFICANT CHANGE UP (ref 43–77)
NRBC # FLD: 0 K/UL — SIGNIFICANT CHANGE UP (ref 0–0)
PLATELET # BLD AUTO: 177 K/UL — SIGNIFICANT CHANGE UP (ref 150–400)
PMV BLD: 9.8 FL — SIGNIFICANT CHANGE UP (ref 7–13)
POTASSIUM SERPL-MCNC: 4 MMOL/L — SIGNIFICANT CHANGE UP (ref 3.5–5.3)
POTASSIUM SERPL-SCNC: 4 MMOL/L — SIGNIFICANT CHANGE UP (ref 3.5–5.3)
PROT S FREE PPP-ACNC: 101.1 % — SIGNIFICANT CHANGE UP (ref 70–130)
RBC # BLD: 5 M/UL — SIGNIFICANT CHANGE UP (ref 4.2–5.8)
RBC # FLD: 14.5 % — SIGNIFICANT CHANGE UP (ref 10.3–14.5)
SODIUM SERPL-SCNC: 134 MMOL/L — LOW (ref 135–145)
WBC # BLD: 5.26 K/UL — SIGNIFICANT CHANGE UP (ref 3.8–10.5)
WBC # FLD AUTO: 5.26 K/UL — SIGNIFICANT CHANGE UP (ref 3.8–10.5)

## 2019-05-20 RX ORDER — FUROSEMIDE 40 MG
1 TABLET ORAL
Qty: 60 | Refills: 0
Start: 2019-05-20 | End: 2019-06-18

## 2019-05-20 RX ADMIN — APIXABAN 10 MILLIGRAM(S): 2.5 TABLET, FILM COATED ORAL at 18:35

## 2019-05-20 RX ADMIN — CARVEDILOL PHOSPHATE 3.12 MILLIGRAM(S): 80 CAPSULE, EXTENDED RELEASE ORAL at 18:35

## 2019-05-20 RX ADMIN — Medication 1 SPRAY(S): at 05:33

## 2019-05-20 RX ADMIN — Medication 325 MILLIGRAM(S): at 12:32

## 2019-05-20 RX ADMIN — Medication 1 SPRAY(S): at 18:34

## 2019-05-20 RX ADMIN — Medication 40 MILLIGRAM(S): at 05:33

## 2019-05-20 RX ADMIN — Medication 81 MILLIGRAM(S): at 12:32

## 2019-05-20 RX ADMIN — APIXABAN 10 MILLIGRAM(S): 2.5 TABLET, FILM COATED ORAL at 05:33

## 2019-05-20 RX ADMIN — LISINOPRIL 10 MILLIGRAM(S): 2.5 TABLET ORAL at 05:33

## 2019-05-20 RX ADMIN — CARVEDILOL PHOSPHATE 3.12 MILLIGRAM(S): 80 CAPSULE, EXTENDED RELEASE ORAL at 05:33

## 2019-05-20 RX ADMIN — Medication 40 MILLIGRAM(S): at 18:34

## 2019-05-20 NOTE — DISCHARGE NOTE NURSING/CASE MANAGEMENT/SOCIAL WORK - NSDCPEPT PROEDHF_GEN_ALL_CORE
Report signs and symptoms to primary care provider/Low salt diet/Activities as tolerated/Monitor weight daily/Call primary care provider for follow up after discharge

## 2019-05-20 NOTE — PROGRESS NOTE ADULT - ASSESSMENT
55 y/o male, with a PmHx of HTN, HLD, Anemia, Obesity, presented to the [facility] ED with SUMMERS and productive cough x  5 days. Admitted to telemetry for r/o acs.    Problem/Plan - 1:  ·  Problem: SUMMERS (dyspnea on exertion).  Plan: cT chest with PE  start eliquis 10 BID followed with 5 BID   heme fu fopr hypercoag nair   cw lasix for acute systolic CHF exacerbation  ischemia evaluation as per cards    Problem/Plan - 2:  ·  Problem: Atrial fibrillation.  Plan: crds following  on lovenox     Problem/Plan - 3:  ·  Problem: Hyperlipidemia.  Plan: cont statin.     Problem/Plan - 4:  ·  Problem: Hypertension.  Plan: monitor bp, cont meds, adjust as needed.     Problem/Plan - 5:  ·  Problem: Iron deficiency anemia.  Plan: cont iron.     D/C to home with close O/P F/U
53 y/o male, with a PmHx of HTN, HLD, Anemia, Obesity, presented to the [facility] ED with SUMMERS and productive cough x  5 days. Admitted to telemetry for r/o acs.
53 y/o male, with a PmHx of HTN, HLD, Anemia, Obesity, presented to the [facility] ED with SUMMERS and productive cough x  5 days. Admitted to telemetry for r/o acs.
53 y/o male, with a PmHx of HTN, HLD, Anemia, Obesity, presented to the [facility] ED with SUMMERS and productive cough x  5 days. Admitted to telemetry for r/o acs.
53 y/o male, with a PmHx of HTN, HLD, Anemia, Obesity, presented to the [facility] ED with SUMMERS and productive cough x  5 days. Admitted to telemetry for r/o acs.    Problem/Plan - 1:  ·  Problem: SUMMERS (dyspnea on exertion).  Plan: cT chest with PE  start eliquis 10 BID followed with 5 BID   heme fu fopr hypercoag nair   cw lasix for acute systolic CHF exacerbation  ischemia evaluation as per cards, defered as outpt secondary to PE     Problem/Plan - 2:  ·  Problem: Atrial fibrillation.  Plan: crds following  on lovenox     Problem/Plan - 3:  ·  Problem: Hyperlipidemia.  Plan: cont statin.     Problem/Plan - 4:  ·  Problem: Hypertension.  Plan: monitor bp, cont meds, adjust as needed.     Problem/Plan - 5:  ·  Problem: Iron deficiency anemia.  Plan: cont iron.
53 y/o male, with a PmHx of HTN, HLD, Anemia, Obesity, presented to the [facility] ED with SUMMERS and productive cough x  5 days. Admitted to telemetry for r/o acs.    Problem/Plan - 1:  ·  Problem: SUMMERS (dyspnea on exertion).  Plan: cT chest with PE  cw lovenox   heme fu fopr hypercoag nair   cw lasix for acute systolic CHF exacerbation  ischemia evaluation as per cards    Problem/Plan - 2:  ·  Problem: Atrial fibrillation.  Plan: crds following  on lovenox     Problem/Plan - 3:  ·  Problem: Hyperlipidemia.  Plan: cont statin.     Problem/Plan - 4:  ·  Problem: Hypertension.  Plan: monitor bp, cont meds, adjust as needed.     Problem/Plan - 5:  ·  Problem: Iron deficiency anemia.  Plan: cont iron.
53 y/o male, with a PmHx of HTN, HLD, Anemia, Obesity, presented to the [facility] ED with SUMMERS and productive cough x  5 days. Admitted to telemetry for r/o acs.
53 yo obese black male with new pulmonary emboli and DVT. No clear precipitating factor although obesity would be a risk factor. No family hx of clotting or bleeding events. Will proceed with a hypercoagulable work up.    Considering a nodule in lung noted on chest CT, would obtain an abdominal CT , check PSA and CEA to r/o an occult malignancy.
55 y/o male, with a PmHx of HTN, HLD, Anemia, Obesity, presented to the [facility] ED with SUMMERS and productive cough x  5 days. Admitted to telemetry for r/o acs.    Problem/Plan - 1:  ·  Problem: SUMMERS (dyspnea on exertion).  Plan: cT chest with PE  start eliquis 10 BID followed with 5 BID   heme fu fopr hypercoag nair   cw lasix for acute systolic CHF exacerbation  ischemia evaluation as per cards    Problem/Plan - 2:  ·  Problem: Atrial fibrillation.  Plan: crds following  on lovenox     Problem/Plan - 3:  ·  Problem: Hyperlipidemia.  Plan: cont statin.     Problem/Plan - 4:  ·  Problem: Hypertension.  Plan: monitor bp, cont meds, adjust as needed.     Problem/Plan - 5:  ·  Problem: Iron deficiency anemia.  Plan: cont iron.
55 y/o male, with a PmHx of HTN, HLD, Anemia, Obesity, presented to the [facility] ED with SUMMERS and productive cough x  5 days. Admitted to telemetry for r/o acs.    Problem/Plan - 1:  ·  Problem: SUMMERS (dyspnea on exertion).  Plan: cT chest with PE  cw lovenox   heme fu fopr hypercoag nair   cw lasix for acute systolic CHF exacerbation  ischemia evaluation as per cards    Problem/Plan - 2:  ·  Problem: Atrial fibrillation.  Plan: crds following  on lovenox     Problem/Plan - 3:  ·  Problem: Hyperlipidemia.  Plan: cont statin.     Problem/Plan - 4:  ·  Problem: Hypertension.  Plan: monitor bp, cont meds, adjust as needed.     Problem/Plan - 5:  ·  Problem: Iron deficiency anemia.  Plan: cont iron.
55 yo obese black male with new pulmonary emboli and DVT. No clear precipitating factor although obesity would be a risk factor. No family hx of clotting or bleeding events. Will proceed with a hypercoagulable work up.    Considering a nodule in lung noted on chest CT, would obtain an abdominal CT , check PSA and CEA to r/o an occult malignancy.
Acute PE  on a/c  heme eval appreciated     Acute systolic chf  cont ace, BB  cont lasix as ordered   ischemic eval on hold to be done as outpt given acute PE   discussed lifevest with pt, he refused     abnormal EKG  in sinus  cont BB    pulmonary hypertension  due to CHF and PE   cont diuretics
Acute PE  on a/c  heme eval appreciated   may change to Eliquis     Acute systolic chf  cont ace, BB  cont lasix as ordered   ischemic eval on hold to be done as outpt given acute PE   discussed lifevest with pt, he refused     abnormal EKG  in sinus  cont BB    pulmonary hypertension  due to CHF and PE   cont diuretics
Acute PE  on a/c  heme eval to rule out hypercoag state vs malignancy     Acute systolic chf  cont ace, BB  change lasix to 40 bid  ischemic eval on hold to be done as outpt given acute PE   will consider lifevest     abnormal EKG  in sinus  cont BB    pulmonary hypertension  due to CHF and PE   cont diuretics
Acute PE  on a/c  stable     Acute systolic chf  cont ace, BB  cont lasix as ordered   ischemic eval on hold to be done as outpt given acute PE   discussed lifevest with pt, he refused     abnormal EKG  in sinus  cont BB    pulmonary hypertension  due to CHF and PE   cont diuretics     DC planning
Acute PE  on a/c  stable     Acute systolic chf  cont ace, BB  cont lasix as ordered   ischemic eval on hold to be done as outpt given acute PE   discussed lifevest with pt, he refused     abnormal EKG  in sinus  cont BB    pulmonary hypertension  due to CHF and PE   cont diuretics     DC planning as per primary team
55 y/o male, with a PmHx of HTN, HLD, Anemia, Obesity, presented to the [facility] ED with SUMMERS and productive cough x  5 days. Admitted to telemetry for r/o acs.

## 2019-05-20 NOTE — PROGRESS NOTE ADULT - PROBLEM SELECTOR PLAN 2
5 mm outpt follow up

## 2019-05-20 NOTE — PROGRESS NOTE ADULT - PROBLEM SELECTOR PROBLEM 4
Iron deficiency anemia

## 2019-05-20 NOTE — PROGRESS NOTE ADULT - REASON FOR ADMISSION
SOB/Nasal Congestion

## 2019-05-20 NOTE — PROGRESS NOTE ADULT - PROBLEM SELECTOR PLAN 3
Controlled
Controlled
Controlled  5/17: Controlled!
Controlled  5/17: Controlled!  5/18L HD stable
Controlled  5/17: Controlled!  5/18L HD stable  5/19: controlled

## 2019-05-20 NOTE — PROGRESS NOTE ADULT - PROBLEM SELECTOR PROBLEM 1
Pulmonary embolism
R/O Hypercoagulation syndrome
R/O Hypercoagulation syndrome
Pulmonary embolism

## 2019-05-20 NOTE — DISCHARGE NOTE NURSING/CASE MANAGEMENT/SOCIAL WORK - NSDCDPATPORTLINK_GEN_ALL_CORE
You can access the [facility] Portal, offered by [facility], by registering with the following website: [email or web address]

## 2019-05-20 NOTE — PROGRESS NOTE ADULT - PROVIDER SPECIALTY LIST ADULT
Cardiology
Heme/Onc
Heme/Onc
Hospitalist
Hospitalist
Internal Medicine
Internal Medicine
Pulmonology
Cardiology
Internal Medicine
Pulmonology

## 2019-05-20 NOTE — PROGRESS NOTE ADULT - PROBLEM SELECTOR PROBLEM 2
Pulmonary nodule, right

## 2019-05-20 NOTE — PROGRESS NOTE ADULT - PROBLEM SELECTOR PLAN 1
on lovenox: He has severe pulmonary hypertension? secondary to PE: this needs to be followed up as an outpatient: Hs rigth popliteal dvt too
on lovenox: He has severe pulmonary hypertension? secondary to PE: this needs to be followed up as an outpatient: Hs rigth popliteal dvt too  5/17: Cont AC? eliquis on dc: workup pending:
on lovenox: He has severe pulmonary hypertension? secondary to PE: this needs to be followed up as an outpatient: Hs rigth popliteal dvt too  : Cont AC? eliquis on dc: workup pendin/18: on eliquis: has severe pulm htn: needs to be followed up as an outpatient:
Homocycsteine level mildly elevated - doubt this is of clinical significance- B12 and folate normal  remainder of of hypercoag workup normal to date.  Phospholipid antibodies, prothrombin gene mutation, Leiden factor 5 mutation pending.  CT abdomen and pelvis no malignancy.  Advised to call office in several days to follow up on pending results.
Homocycsteine level mildly elevated - doubt this is of clinical significance- check B12 and folate  remainder of of hypercoag workup noormal  Phospholipid antibodies, prothrombin gene mutation, Leiden factor 5 mutation pending.  CT abdomen and pelvis pending.
on lovenox: He has severe pulmonary hypertension? secondary to PE: this needs to be followed up as an outpatient: Hs rigth popliteal dvt too  : Cont AC? eliquis on dc: workup pendin/18: on eliquis: has severe pulm htn: needs to be followed up as an outpatient:  : cont AC
on lovenox: He has severe pulmonary hypertension? secondary to PE: this needs to be followed up as an outpatient: Hs rigth popliteal dvt too  : Cont AC? eliquis on dc: workup pendin/18: on eliquis: has severe pulm htn: needs to be followed up as an outpatient:  : cont AC  : cont AC

## 2019-05-20 NOTE — PROGRESS NOTE ADULT - ATTENDING COMMENTS
Advanced care planning was discussed with patient and family.  Risks, benefits and alternatives of the cardiac treatments and medical therapy including procedures were discussed in detail and all questions were answered. 30 minutes face to face spent.
Neville garces  5/17: SEEMS TO HAVE GOTTEN BETTER:  5/18: on noac now:  5/19: stable  5/20: for dc today
Neville garces
Neville garces  5/17: SEEMS TO HAVE GOTTEN BETTER:
Neville garces  5/17: SEEMS TO HAVE GOTTEN BETTER:  5/18: on noac now:
Neville garces  5/17: SEEMS TO HAVE GOTTEN BETTER:  5/18: on noac now:  5/19: stable

## 2019-05-23 LAB
B2 GLYCOPROT1 AB SER QL: POSITIVE — SIGNIFICANT CHANGE UP
CARDIOLIPIN IGM SER-MCNC: 0.89 MPL — SIGNIFICANT CHANGE UP (ref 0–11)
CARDIOLIPIN IGM SER-MCNC: 0.89 MPL — SIGNIFICANT CHANGE UP (ref 0–11)
CARDIOLIPIN IGM SER-MCNC: 7.83 GPL — SIGNIFICANT CHANGE UP (ref 0–23)
CARDIOLIPIN IGM SER-MCNC: 7.83 GPL — SIGNIFICANT CHANGE UP (ref 0–23)

## 2019-05-28 LAB
DNA PLOIDY SPEC FC-IMP: NORMAL — SIGNIFICANT CHANGE UP
PTR INTERPRETATION: NORMAL — SIGNIFICANT CHANGE UP

## 2019-09-10 NOTE — PROGRESS NOTE ADULT - SUBJECTIVE AND OBJECTIVE BOX
Patient is a 54y old  Male who presents with a chief complaint of SOB/Nasal Congestion (16 May 2019 10:00)      Any change in ROS: Pt is doing ok : better SOB : no cough :     MEDICATIONS  (STANDING):  aspirin enteric coated 81 milliGRAM(s) Oral daily  atorvastatin 20 milliGRAM(s) Oral at bedtime  carvedilol 3.125 milliGRAM(s) Oral every 12 hours  enoxaparin Injectable 130 milliGRAM(s) SubCutaneous two times a day  ferrous    sulfate 325 milliGRAM(s) Oral daily  furosemide   Injectable 40 milliGRAM(s) IV Push two times a day  lisinopril 10 milliGRAM(s) Oral daily    MEDICATIONS  (PRN):  acetaminophen   Tablet .. 650 milliGRAM(s) Oral every 6 hours PRN Temp greater or equal to 38C (100.4F), Mild Pain (1 - 3), Moderate Pain (4 - 6)    Vital Signs Last 24 Hrs  T(C): 36.8 (16 May 2019 06:16), Max: 37.2 (15 May 2019 21:29)  T(F): 98.2 (16 May 2019 06:16), Max: 98.9 (15 May 2019 21:29)  HR: 69 (16 May 2019 06:16) (69 - 85)  BP: 137/91 (16 May 2019 06:16) (135/87 - 144/73)  BP(mean): --  RR: 16 (16 May 2019 06:16) (16 - 17)  SpO2: 97% (16 May 2019 06:16) (97% - 98%)    I&O's Summary        Physical Exam:   GENERAL:Obese  HEENT: FÁTIMA/   Atraumatic, Normocephalic  ENMT: No tonsillar erythema, exudates, or enlargement; Moist mucous membranes, Good dentition, No lesions  NECK: Supple, No JVD, Normal thyroid  CHEST/LUNG: Clear to auscultaion, ; No rales, rhonchi, wheezing, or rubs  CVS: Regular rate and rhythm; No murmurs, rubs, or gallops  GI: : Soft, Nontender, Nondistended; Bowel sounds present  NERVOUS SYSTEM:  Alert & Oriented X3  EXTREMITIES:  2+ Peripheral Pulses, No clubbing, cyanosis, or edema  LYMPH: No lymphadenopathy noted  SKIN: No rashes or lesions  ENDOCRINOLOGY: No Thyromegaly  PSYCH: Appropriate    Labs:  26                            12.3   4.36  )-----------( 169      ( 16 May 2019 07:41 )             40.2                         13.2   7.18  )-----------( 161      ( 15 May 2019 10:45 )             43.6                         13.0   7.61  )-----------( 164      ( 14 May 2019 17:15 )             41.7     05-16    140  |  103  |  14  ----------------------------<  101<H>  3.8   |  24  |  1.06  05-15    141  |  103  |  12  ----------------------------<  97  4.0   |  25  |  1.04  05-14    143  |  105  |  14  ----------------------------<  101<H>  4.3   |  26  |  1.10    Ca    9.2      16 May 2019 07:41  Ca    9.5      15 May 2019 11:24  Ca    9.6      14 May 2019 17:15  Mg     2.2     05-15    TPro  7.9  /  Alb  4.0  /  TBili  0.6  /  DBili  x   /  AST  28  /  ALT  26  /  AlkPhos  52  05-14    CAPILLARY BLOOD GLUCOSE          LIVER FUNCTIONS - ( 14 May 2019 17:15 )  Alb: 4.0 g/dL / Pro: 7.9 g/dL / ALK PHOS: 52 u/L / ALT: 26 u/L / AST: 28 u/L / GGT: x           PT/INR - ( 16 May 2019 07:41 )   PT: 14.7 SEC;   INR: 1.31          PTT - ( 16 May 2019 07:41 )  PTT:34.9 SEC    Serum Pro-Brain Natriuretic Peptide: 2024 pg/mL (05-15 @ 11:24)        RECENT CULTURES:        RESPIRATORY CULTURES:    < from: Transthoracic Echocardiogram (05.15.19 @ 14:16) >  Pericardium/PleuraNormal pericardium with no pericardial  effusion.  Hemodynamic: Estimated right ventricular systolic pressure  equals 65 mm Hg, assuming right atrial pressure equals 10  mm Hg, consistent with severe pulmonary hypertension.  ------------------------------------------------------------------------  CONCLUSIONS:  1. Normal mitral valve. Minimal mitral regurgitation.  2. Normal left ventricular internal dimensions and wall  thicknesses.  3. Endocardium not well visualized; grossly severe global  left ventricular systolic dysfunction.  4. The right ventricle is not well visualized; grossly  normal right ventricular systolic function.  5. Estimated right ventricular systolic pressure equals 65  mm Hg, assuming right atrial pressure equals 10 mm Hg,  consistent with severe pulmonary hypertension.  *** No previous Echo exam.  ------------------------------------------------------------------------  Confirmed on  5/15/2019 - 16:10:05 by Maury Brady M.D.  ------------------------------------------------------------------------    < end of copied text >        Studies  Chest X-RAY  CT SCAN Chest   Venous Dopplers: LE:   CT Abdomen  Others
Patient is a 54y old  Male who presents with a chief complaint of SOB/Nasal Congestion (17 May 2019 11:43)      Any change in ROS: not much of SOB :     MEDICATIONS  (STANDING):  aspirin enteric coated 81 milliGRAM(s) Oral daily  atorvastatin 20 milliGRAM(s) Oral at bedtime  carvedilol 3.125 milliGRAM(s) Oral every 12 hours  enoxaparin Injectable 130 milliGRAM(s) SubCutaneous two times a day  ferrous    sulfate 325 milliGRAM(s) Oral daily  furosemide   Injectable 40 milliGRAM(s) IV Push two times a day  lisinopril 10 milliGRAM(s) Oral daily    MEDICATIONS  (PRN):  acetaminophen   Tablet .. 650 milliGRAM(s) Oral every 6 hours PRN Temp greater or equal to 38C (100.4F), Mild Pain (1 - 3), Moderate Pain (4 - 6)    Vital Signs Last 24 Hrs  T(C): 36.6 (17 May 2019 05:07), Max: 36.9 (16 May 2019 13:49)  T(F): 97.9 (17 May 2019 05:07), Max: 98.4 (16 May 2019 13:49)  HR: 71 (17 May 2019 05:07) (68 - 74)  BP: 133/86 (17 May 2019 05:07) (122/78 - 133/86)  BP(mean): --  RR: 18 (17 May 2019 05:07) (17 - 18)  SpO2: 100% (17 May 2019 05:07) (95% - 100%)    I&O's Summary    17 May 2019 07:01  -  17 May 2019 12:52  --------------------------------------------------------  IN: 720 mL / OUT: 200 mL / NET: 520 mL          Physical Exam:   GENERAL: NAD, well-groomed, well-developed  HEENT: FÁTIMA/   Atraumatic, Normocephalic  ENMT: No tonsillar erythema, exudates, or enlargement; Moist mucous membranes, Good dentition, No lesions  NECK: Supple, No JVD, Normal thyroid  CHEST/LUNG: Clear to auscultaion  CVS: Regular rate and rhythm; No murmurs, rubs, or gallops  GI: : Soft, Nontender, Nondistended; Bowel sounds present  NERVOUS SYSTEM:  Alert & Oriented X3  EXTREMITIES:  2+ Peripheral Pulses, No clubbing, cyanosis, or edema  LYMPH: No lymphadenopathy noted  SKIN: No rashes or lesions  ENDOCRINOLOGY: No Thyromegaly  PSYCH: Appropriate    Labs:  26                            13.3   4.94  )-----------( 176      ( 17 May 2019 06:44 )             42.3                         12.3   4.36  )-----------( 169      ( 16 May 2019 07:41 )             40.2                         13.2   7.18  )-----------( 161      ( 15 May 2019 10:45 )             43.6                         13.0   7.61  )-----------( 164      ( 14 May 2019 17:15 )             41.7     05-17    139  |  101  |  20  ----------------------------<  105<H>  3.9   |  24  |  1.16  05-16    140  |  103  |  14  ----------------------------<  101<H>  3.8   |  24  |  1.06  05-15    141  |  103  |  12  ----------------------------<  97  4.0   |  25  |  1.04  05-14    143  |  105  |  14  ----------------------------<  101<H>  4.3   |  26  |  1.10    Ca    9.3      17 May 2019 06:44  Ca    9.2      16 May 2019 07:41  Mg     1.9     05-17    TPro  7.9  /  Alb  4.0  /  TBili  0.6  /  DBili  x   /  AST  28  /  ALT  26  /  AlkPhos  52  05-14    CAPILLARY BLOOD GLUCOSE            PT/INR - ( 17 May 2019 06:44 )   PT: 14.0 SEC;   INR: 1.22          PTT - ( 17 May 2019 06:44 )  PTT:44.3 SEC    Serum Pro-Brain Natriuretic Peptide: 2024 pg/mL (05-15 @ 11:24)        RECENT CULTURES:      < from: CT Angio Chest w/ IV Cont (05.15.19 @ 09:12) >  Upper Abdomen: Small Bochdalek and hiatal hernia.    Bones And Soft Tissues: Degenerative change of the spine.    IMPRESSION:     Acute bilateral pulmonary emboli with suggestion ofright heart strain.    Dr. Montano discussed these findings with AMANDO Brown on 5/15/2019 9:34   AM, with read back.    5 mm right lower lobe pulmonary nodule. A 6-12 month follow-up chest CT   can be performed for further evaluation as clinically warranted.            < from: Transthoracic Echocardiogram (05.15.19 @ 14:16) >  effusion.  Hemodynamic: Estimated right ventricular systolic pressure  equals 65 mm Hg, assuming right atrial pressure equals 10  mm Hg, consistent with severe pulmonary hypertension.  ------------------------------------------------------------------------  CONCLUSIONS:  1. Normal mitral valve. Minimal mitral regurgitation.  2. Normal left ventricular internal dimensions and wall  thicknesses.  3. Endocardium not well visualized; grossly severe global  left ventricular systolic dysfunction.  4. The right ventricle is not well visualized; grossly  normal right ventricular systolic function.  5. Estimated right ventricular systolic pressure equals 65  mm Hg, assuming right atrial pressure equals 10 mm Hg,  consistent with severe pulmonary hypertension.  *** No previous Echo exam.  ------------------------------------------------------------------------  Confirmed on  5/15/2019 - 16:10:05 by Maury Brady M.D.  ------------------------------------------------------------------------    < end of copied text >    OC MONTANO M.D., RADIOLOGY RESIDENT  This document has been electronically signed.  BERTO GARCIA M.D. ATTENDING RADIOLOGIST    < end of copied text >    RESPIRATORY CULTURES:          Studies  Chest X-RAY  CT SCAN Chest   Venous Dopplers: LE:   CT Abdomen  Others
Patient is a 54y old  Male who presents with a chief complaint of SOB/Nasal Congestion (18 May 2019 12:25)      Any change in ROS: Pti shania barboza ok : sitting at bedside SOB isbetter    MEDICATIONS  (STANDING):  apixaban 10 milliGRAM(s) Oral every 12 hours  aspirin enteric coated 81 milliGRAM(s) Oral daily  atorvastatin 20 milliGRAM(s) Oral at bedtime  carvedilol 3.125 milliGRAM(s) Oral every 12 hours  ferrous    sulfate 325 milliGRAM(s) Oral daily  fluticasone propionate 50 MICROgram(s)/spray Nasal Spray 1 Spray(s) Both Nostrils two times a day  furosemide   Injectable 40 milliGRAM(s) IV Push two times a day  lisinopril 10 milliGRAM(s) Oral daily    MEDICATIONS  (PRN):  acetaminophen   Tablet .. 650 milliGRAM(s) Oral every 6 hours PRN Temp greater or equal to 38C (100.4F), Mild Pain (1 - 3), Moderate Pain (4 - 6)    Vital Signs Last 24 Hrs  T(C): 36.9 (18 May 2019 11:59), Max: 36.9 (18 May 2019 01:27)  T(F): 98.4 (18 May 2019 11:59), Max: 98.4 (18 May 2019 01:27)  HR: 80 (18 May 2019 11:59) (74 - 80)  BP: 128/80 (18 May 2019 11:59) (111/68 - 149/83)  BP(mean): --  RR: 18 (18 May 2019 11:59) (16 - 18)  SpO2: 100% (18 May 2019 11:59) (98% - 100%)    I&O's Summary    17 May 2019 07:01  -  18 May 2019 07:00  --------------------------------------------------------  IN: 1408 mL / OUT: 5775 mL / NET: -4367 mL    18 May 2019 07:01  -  18 May 2019 14:59  --------------------------------------------------------  IN: 540 mL / OUT: 700 mL / NET: -160 mL          Physical Exam:   GENERAL: Obese  HEENT: FÁTIMA/   Atraumatic, Normocephalic  ENMT: No tonsillar erythema, exudates, or enlargement; Moist mucous membranes, Good dentition, No lesions  NECK: Supple, No JVD, Normal thyroid  CHEST/LUNG: Clear to auscultaion, ; No rales, rhonchi, wheezing, or rubs  CVS: Regular rate and rhythm; No murmurs, rubs, or gallops  GI: : Soft, Nontender, Nondistended; Bowel sounds present  NERVOUS SYSTEM:  Alert & Oriented X3  EXTREMITIES:  2+ Peripheral Pulses, No clubbing, cyanosis, or edema  LYMPH: No lymphadenopathy noted  SKIN: No rashes or lesions  ENDOCRINOLOGY: No Thyromegaly  PSYCH: Appropriate    Labs:  26                            13.2   4.66  )-----------( 167      ( 18 May 2019 01:52 )             41.5                         13.3   4.94  )-----------( 176      ( 17 May 2019 06:44 )             42.3                         12.3   4.36  )-----------( 169      ( 16 May 2019 07:41 )             40.2                         13.2   7.18  )-----------( 161      ( 15 May 2019 10:45 )             43.6                         13.0   7.61  )-----------( 164      ( 14 May 2019 17:15 )             41.7     05-18    138  |  98  |  15  ----------------------------<  97  3.8   |  27  |  0.99  05-17    139  |  101  |  20  ----------------------------<  105<H>  3.9   |  24  |  1.16  05-16    140  |  103  |  14  ----------------------------<  101<H>  3.8   |  24  |  1.06  05-15    141  |  103  |  12  ----------------------------<  97  4.0   |  25  |  1.04  05-14    143  |  105  |  14  ----------------------------<  101<H>  4.3   |  26  |  1.10    Ca    9.2      18 May 2019 01:52  Ca    9.3      17 May 2019 06:44  Mg     2.0     05-18  Mg     1.9     05-17    TPro  7.9  /  Alb  4.0  /  TBili  0.6  /  DBili  x   /  AST  28  /  ALT  26  /  AlkPhos  52  05-14    CAPILLARY BLOOD GLUCOSE    < from: CT Abdomen and Pelvis w/ Oral Cont and w/ IV Cont (05.17.19 @ 12:03) >  FINDINGS:    LOWER CHEST: Within normal limits.    LIVER: Within normal limits.  BILE DUCTS: Normal caliber.  GALLBLADDER: Within normal limits.  SPLEEN: Within normal limits.  PANCREAS: Within normal limits.  ADRENALS: Within normal limits.  KIDNEYS/URETERS: Within normal limits.    BLADDER: Within normal limits.  REPRODUCTIVE ORGANS: Prostate is enlarged.    BOWEL: No bowel obstruction. Appendix is normal.  PERITONEUM: No ascites.  VESSELS:  Retroaortic left renal vein. Scattered atherosclerotic   calcifications.  RETROPERITONEUM: No lymphadenopathy.    ABDOMINAL WALL: Bilateral fat-containing inguinal hernias.  BONES: Degenerative changes of the spine. Severe left hip arthroplasty.    IMPRESSION:     No evidence of malignancy in the abdomen or pelvis.                  ARTURO CARLTON M.D., ATTENDING RADIOLOGIST  This document has been electronically signed. May 17 2019  2:26PM        < end of copied text >          PT/INR - ( 17 May 2019 06:44 )   PT: 14.0 SEC;   INR: 1.22          PTT - ( 17 May 2019 06:44 )  PTT:44.3 SEC          RECENT CULTURES:        RESPIRATORY CULTURES:          Studies  Chest X-RAY  CT SCAN Chest   Venous Dopplers: LE:   CT Abdomen  Others    < from: Transthoracic Echocardiogram (05.15.19 @ 14:16) >  Hemodynamic: Estimated right ventricular systolic pressure  equals 65 mm Hg, assuming right atrial pressure equals 10  mm Hg, consistent with severe pulmonary hypertension.  ------------------------------------------------------------------------  CONCLUSIONS:  1. Normal mitral valve. Minimal mitral regurgitation.  2. Normal left ventricular internal dimensions and wall  thicknesses.  3. Endocardium not well visualized; grossly severe global  left ventricular systolic dysfunction.  4. The right ventricle is not well visualized; grossly  normal right ventricular systolic function.  5. Estimated right ventricular systolic pressure equals 65  mm Hg, assuming right atrial pressure equals 10 mm Hg,  consistent with severe pulmonary hypertension.  *** No previous Echo exam.  ------------------------------------------------------------------------  Confirmed on  5/15/2019 - 16:10:05 by Maury Brady M.D.    < end of copied text >
Patient is a 54y old  Male who presents with a chief complaint of SOB/Nasal Congestion (16 May 2019 13:19)      INTERVAL HPI/OVERNIGHT EVENTS:  T(C): 36.9 (05-16-19 @ 13:49), Max: 37.2 (05-15-19 @ 21:29)  HR: 74 (05-16-19 @ 13:49) (69 - 81)  BP: 132/86 (05-16-19 @ 13:49) (132/86 - 140/70)  RR: 17 (05-16-19 @ 13:49) (16 - 17)  SpO2: 95% (05-16-19 @ 13:49) (95% - 98%)  Wt(kg): --  I&O's Summary      LABS:                        12.3   4.36  )-----------( 169      ( 16 May 2019 07:41 )             40.2     05-16    140  |  103  |  14  ----------------------------<  101<H>  3.8   |  24  |  1.06    Ca    9.2      16 May 2019 07:41  Mg     2.2     05-15    TPro  7.9  /  Alb  4.0  /  TBili  0.6  /  DBili  x   /  AST  28  /  ALT  26  /  AlkPhos  52  05-14    PT/INR - ( 16 May 2019 07:41 )   PT: 14.7 SEC;   INR: 1.31          PTT - ( 16 May 2019 07:41 )  PTT:34.9 SEC    CAPILLARY BLOOD GLUCOSE                MEDICATIONS  (STANDING):  aspirin enteric coated 81 milliGRAM(s) Oral daily  atorvastatin 20 milliGRAM(s) Oral at bedtime  carvedilol 3.125 milliGRAM(s) Oral every 12 hours  enoxaparin Injectable 130 milliGRAM(s) SubCutaneous two times a day  ferrous    sulfate 325 milliGRAM(s) Oral daily  furosemide   Injectable 40 milliGRAM(s) IV Push two times a day  lisinopril 10 milliGRAM(s) Oral daily    MEDICATIONS  (PRN):  acetaminophen   Tablet .. 650 milliGRAM(s) Oral every 6 hours PRN Temp greater or equal to 38C (100.4F), Mild Pain (1 - 3), Moderate Pain (4 - 6)          PHYSICAL EXAM:  GENERAL: obese  CHEST/LUNG: Clear to percussion bilaterally; No rales, rhonchi, wheezing, or rubs  HEART: Regular rate and rhythm; No murmurs, rubs, or gallops  ABDOMEN: Soft, Nontender, Nondistended; Bowel sounds present  EXTREMITIES:  edema+    Care Discussed with Consultants/Other Providers [ ] YES  [ ] NO
Patient is a 54y old  Male who presents with a chief complaint of SOB/Nasal Congestion (19 May 2019 12:21)      Any change in ROS: Feeling ok : nose is congested:     MEDICATIONS  (STANDING):  apixaban 10 milliGRAM(s) Oral every 12 hours  aspirin enteric coated 81 milliGRAM(s) Oral daily  atorvastatin 20 milliGRAM(s) Oral at bedtime  carvedilol 3.125 milliGRAM(s) Oral every 12 hours  ferrous    sulfate 325 milliGRAM(s) Oral daily  fluticasone propionate 50 MICROgram(s)/spray Nasal Spray 1 Spray(s) Both Nostrils two times a day  furosemide   Injectable 40 milliGRAM(s) IV Push two times a day  lisinopril 10 milliGRAM(s) Oral daily    MEDICATIONS  (PRN):  acetaminophen   Tablet .. 650 milliGRAM(s) Oral every 6 hours PRN Temp greater or equal to 38C (100.4F), Mild Pain (1 - 3), Moderate Pain (4 - 6)    Vital Signs Last 24 Hrs  T(C): 36.9 (19 May 2019 11:25), Max: 36.9 (18 May 2019 16:40)  T(F): 98.4 (19 May 2019 11:25), Max: 98.5 (18 May 2019 16:40)  HR: 79 (19 May 2019 11:25) (79 - 85)  BP: 145/76 (19 May 2019 11:25) (135/84 - 154/90)  BP(mean): --  RR: 20 (19 May 2019 11:25) (16 - 20)  SpO2: 97% (19 May 2019 11:25) (97% - 100%)    I&O's Summary    18 May 2019 07:01  -  19 May 2019 07:00  --------------------------------------------------------  IN: 1150 mL / OUT: 3500 mL / NET: -2350 mL    19 May 2019 07:01  -  19 May 2019 12:32  --------------------------------------------------------  IN: 360 mL / OUT: 1300 mL / NET: -940 mL          Physical Exam:   GENERAL: NAD, well-groomed, well-developed  HEENT: FÁTIMA/   Atraumatic, Normocephalic  ENMT: No tonsillar erythema, exudates, or enlargement; Moist mucous membranes, Good dentition, No lesions  NECK: Supple, No JVD, Normal thyroid  CHEST/LUNG: Clear to auscultaion, ; No rales, rhonchi, wheezing, or rubs  CVS: Regular rate and rhythm; No murmurs, rubs, or gallops  GI: : Soft, Nontender, Nondistended; Bowel sounds present  NERVOUS SYSTEM:  Alert & Oriented X3  EXTREMITIES:  2+ Peripheral Pulses, No clubbing, cyanosis, or edema  LYMPH: No lymphadenopathy noted  SKIN: No rashes or lesions  ENDOCRINOLOGY: No Thyromegaly  PSYCH: Appropriate    Labs:  26                            12.9   5.18  )-----------( 173      ( 19 May 2019 06:00 )             40.2                         13.2   4.66  )-----------( 167      ( 18 May 2019 01:52 )             41.5                         13.3   4.94  )-----------( 176      ( 17 May 2019 06:44 )             42.3                         12.3   4.36  )-----------( 169      ( 16 May 2019 07:41 )             40.2     05-19    141  |  101  |  16  ----------------------------<  87  4.2   |  26  |  1.05  05-18    138  |  98  |  15  ----------------------------<  97  3.8   |  27  |  0.99  05-17    139  |  101  |  20  ----------------------------<  105<H>  3.9   |  24  |  1.16  05-16    140  |  103  |  14  ----------------------------<  101<H>  3.8   |  24  |  1.06    Ca    9.4      19 May 2019 06:00  Ca    9.2      18 May 2019 01:52  Mg     2.2     05-19  Mg     2.0     05-18      CAPILLARY BLOOD GLUCOSE                  < from: CT Abdomen and Pelvis w/ Oral Cont and w/ IV Cont (05.17.19 @ 12:03) >  S:    LOWER CHEST: Within normal limits.    LIVER: Within normal limits.  BILE DUCTS: Normal caliber.  GALLBLADDER: Within normal limits.  SPLEEN: Within normal limits.  PANCREAS: Within normal limits.  ADRENALS: Within normal limits.  KIDNEYS/URETERS: Within normal limits.    BLADDER: Within normal limits.  REPRODUCTIVE ORGANS: Prostate is enlarged.    BOWEL: No bowel obstruction. Appendix is normal.  PERITONEUM: No ascites.  VESSELS:  Retroaortic left renal vein. Scattered atherosclerotic   calcifications.  RETROPERITONEUM: No lymphadenopathy.    ABDOMINAL WALL: Bilateral fat-containing inguinal hernias.  BONES: Degenerative changes of the spine. Severe left hip arthroplasty.    IMPRESSION:     No evidence of malignancy in the abdomen or pelvis.                  ARTURO CARLTON M.D., ATTENDING RADIOLOGIST  This document has been electronically signed. May 17 2019  2:26PM        < end of copied text >      RECENT CULTURES:        RESPIRATORY CULTURES:          Studies  Chest X-RAY  CT SCAN Chest   Venous Dopplers: LE:   CT Abdomen  Others
Patient is a 54y old  Male who presents with a chief complaint of SOB/Nasal Congestion (20 May 2019 13:34)      INTERVAL HPI/OVERNIGHT EVENTS:  T(C): 36.8 (05-20-19 @ 18:31), Max: 37.1 (05-19-19 @ 20:57)  HR: 81 (05-20-19 @ 18:31) (80 - 82)  BP: 116/86 (05-20-19 @ 18:31) (109/82 - 132/72)  RR: 18 (05-20-19 @ 18:31) (18 - 18)  SpO2: 100% (05-20-19 @ 18:31) (99% - 100%)  Wt(kg): --  I&O's Summary    19 May 2019 07:01  -  20 May 2019 07:00  --------------------------------------------------------  IN: 1380 mL / OUT: 1900 mL / NET: -520 mL        LABS:                        13.4   5.26  )-----------( 177      ( 20 May 2019 07:34 )             42.7     05-20    134<L>  |  98  |  17  ----------------------------<  95  4.0   |  24  |  0.99    Ca    9.4      20 May 2019 07:34  Mg     2.1     05-20          CAPILLARY BLOOD GLUCOSE                MEDICATIONS  (STANDING):  apixaban 10 milliGRAM(s) Oral every 12 hours  aspirin enteric coated 81 milliGRAM(s) Oral daily  atorvastatin 20 milliGRAM(s) Oral at bedtime  carvedilol 3.125 milliGRAM(s) Oral every 12 hours  ferrous    sulfate 325 milliGRAM(s) Oral daily  fluticasone propionate 50 MICROgram(s)/spray Nasal Spray 1 Spray(s) Both Nostrils two times a day  furosemide    Tablet 40 milliGRAM(s) Oral two times a day  lisinopril 10 milliGRAM(s) Oral daily    MEDICATIONS  (PRN):  acetaminophen   Tablet .. 650 milliGRAM(s) Oral every 6 hours PRN Temp greater or equal to 38C (100.4F), Mild Pain (1 - 3), Moderate Pain (4 - 6)          PHYSICAL EXAM:  GENERAL: NAD, well-groomed, well-developed  HEAD:  Atraumatic, Normocephalic  CHEST/LUNG: Clear to percussion bilaterally; No rales, rhonchi, wheezing, or rubs  HEART: Regular rate and rhythm; No murmurs, rubs, or gallops  ABDOMEN: Soft, Nontender, Nondistended; Bowel sounds present  EXTREMITIES:  2+ Peripheral Pulses, No clubbing, cyanosis, or edema  LYMPH: No lymphadenopathy noted  SKIN: No rashes or lesions    Care Discussed with Consultants/Other Providers [ ] YES  [ ] NO
Pt is seen and examined  pt is awake and lying in bed/out of bed to chair  Feels better, still some chest congestion  pt seems comfortable and denies any complaints at this time        MEDICATIONS  (STANDING):  aspirin enteric coated 81 milliGRAM(s) Oral daily  atorvastatin 20 milliGRAM(s) Oral at bedtime  carvedilol 3.125 milliGRAM(s) Oral every 12 hours  enoxaparin Injectable 130 milliGRAM(s) SubCutaneous two times a day  ferrous    sulfate 325 milliGRAM(s) Oral daily  furosemide   Injectable 40 milliGRAM(s) IV Push two times a day  lisinopril 10 milliGRAM(s) Oral daily    MEDICATIONS  (PRN):  acetaminophen   Tablet .. 650 milliGRAM(s) Oral every 6 hours PRN Temp greater or equal to 38C (100.4F), Mild Pain (1 - 3), Moderate Pain (4 - 6)      Allergies    No Known Allergies    Intolerances        Vital Signs Last 24 Hrs  T(C): 36.6 (17 May 2019 05:07), Max: 36.9 (16 May 2019 13:49)  T(F): 97.9 (17 May 2019 05:07), Max: 98.4 (16 May 2019 13:49)  HR: 71 (17 May 2019 05:07) (68 - 74)  BP: 133/86 (17 May 2019 05:07) (122/78 - 133/86)  BP(mean): --  RR: 18 (17 May 2019 05:07) (17 - 18)  SpO2: 100% (17 May 2019 05:07) (95% - 100%)    PHYSICAL EXAM  General: adult in NAD  HEENT:  anicteric sclera, pink conjunctiva  CV: normal S1/S2 with no murmur rubs or gallops  Lungs: positive air movement b/l ant lungs,clear to auscultation, no wheezes, no rales  Abdomen: soft non-tender non-distended, no hepatosplenomegaly  Ext: no clubbing cyanosis or edema  Skin: no rashes and no petechiae  Neuro: alert and oriented X 4, no focal deficits  LABS:                          13.3   4.94  )-----------( 176      ( 17 May 2019 06:44 )             42.3         Mean Cell Volume : 85.6 fL  Mean Cell Hemoglobin : 26.9 pg  Mean Cell Hemoglobin Concentration : 31.4 %  Auto Neutrophil # : 2.16 K/uL  Auto Lymphocyte # : 1.92 K/uL  Auto Monocyte # : 0.70 K/uL  Auto Eosinophil # : 0.10 K/uL  Auto Basophil # : 0.03 K/uL  Auto Neutrophil % : 43.7 %  Auto Lymphocyte % : 38.9 %  Auto Monocyte % : 14.2 %  Auto Eosinophil % : 2.0 %  Auto Basophil % : 0.6 %      05-17    139  |  101  |  20  ----------------------------<  105<H>  3.9   |  24  |  1.16    Ca    9.3      17 May 2019 06:44  Mg     1.9     05-17  PT/INR - ( 17 May 2019 06:44 )   PT: 14.0 SEC;   INR: 1.22     PTT - ( 17 May 2019 06:44 )  PTT:44.3 SEC      RADIOLOGY & ADDITIONAL STUDIES:
Pt is seen and examined  pt is awake and out of bed to chair  pt seems comfortable and denies any complaints at this time  Being discharged today.        MEDICATIONS  (STANDING):  apixaban 10 milliGRAM(s) Oral every 12 hours  aspirin enteric coated 81 milliGRAM(s) Oral daily  atorvastatin 20 milliGRAM(s) Oral at bedtime  carvedilol 3.125 milliGRAM(s) Oral every 12 hours  ferrous    sulfate 325 milliGRAM(s) Oral daily  fluticasone propionate 50 MICROgram(s)/spray Nasal Spray 1 Spray(s) Both Nostrils two times a day  furosemide    Tablet 40 milliGRAM(s) Oral two times a day  lisinopril 10 milliGRAM(s) Oral daily    MEDICATIONS  (PRN):  acetaminophen   Tablet .. 650 milliGRAM(s) Oral every 6 hours PRN Temp greater or equal to 38C (100.4F), Mild Pain (1 - 3), Moderate Pain (4 - 6)      Allergies    No Known Allergies    Intolerances        Vital Signs Last 24 Hrs  T(C): 36.6 (20 May 2019 12:37), Max: 37.1 (19 May 2019 20:57)  T(F): 97.9 (20 May 2019 12:37), Max: 98.7 (19 May 2019 20:57)  HR: 80 (20 May 2019 12:37) (78 - 82)  BP: 127/73 (20 May 2019 12:37) (109/82 - 144/98)  BP(mean): --  RR: 18 (20 May 2019 12:37) (18 - 18)  SpO2: 99% (20 May 2019 12:37) (98% - 100%)    PHYSICAL EXAM  General: adult in NAD  HEENT:  anicteric sclera, pink conjunctiva  CV: normal S1/S2 with no murmur rubs or gallops  Lungs: positive air movement b/l ant lungs,clear to auscultation, no wheezes, no rales  Abdomen: soft non-tender non-distended, no hepatosplenomegaly  Ext: no clubbing cyanosis or edema  Skin: no rashes and no petechiae  Neuro: alert and oriented X 4, no focal deficits  LABS:                          13.4   5.26  )-----------( 177      ( 20 May 2019 07:34 )             42.7         Mean Cell Volume : 85.4 fL  Mean Cell Hemoglobin : 26.8 pg  Mean Cell Hemoglobin Concentration : 31.4 %  Auto Neutrophil # : 2.64 K/uL  Auto Lymphocyte # : 1.59 K/uL  Auto Monocyte # : 0.94 K/uL  Auto Eosinophil # : 0.05 K/uL  Auto Basophil # : 0.02 K/uL  Auto Neutrophil % : 50.1 %  Auto Lymphocyte % : 30.2 %  Auto Monocyte % : 17.9 %  Auto Eosinophil % : 1.0 %  Auto Basophil % : 0.4 %      05-20    134<L>  |  98  |  17  ----------------------------<  95  4.0   |  24  |  0.99    Ca    9.4      20 May 2019 07:34  Mg     2.1     05-20    Vitamin B12, Serum: 637 pg/mL (05-18 @ 01:52)  Folate, Serum: 9.9 ng/mL (05-18 @ 01:52)      RADIOLOGY & ADDITIONAL STUDIES:
Subjective: Patient seen and examined. No new events except as noted.     REVIEW OF SYSTEMS:    CONSTITUTIONAL: No weakness, fevers or chills  EYES/ENT: No visual changes;  No vertigo or throat pain   NECK: No pain or stiffness  RESPIRATORY: No cough, wheezing, hemoptysis; No shortness of breath  CARDIOVASCULAR: No chest pain or palpitations  GASTROINTESTINAL: No abdominal or epigastric pain. No nausea, vomiting, or hematemesis; No diarrhea or constipation. No melena or hematochezia.  GENITOURINARY: No dysuria, frequency or hematuria  NEUROLOGICAL: No numbness or weakness  SKIN: No itching, burning, rashes, or lesions   All other review of systems is negative unless indicated above.    MEDICATIONS:  MEDICATIONS  (STANDING):  aspirin enteric coated 81 milliGRAM(s) Oral daily  atorvastatin 20 milliGRAM(s) Oral at bedtime  carvedilol 3.125 milliGRAM(s) Oral every 12 hours  enoxaparin Injectable 130 milliGRAM(s) SubCutaneous two times a day  ferrous    sulfate 325 milliGRAM(s) Oral daily  furosemide   Injectable 40 milliGRAM(s) IV Push two times a day  lisinopril 10 milliGRAM(s) Oral daily      PHYSICAL EXAM:  T(C): 37.1 (05-17-19 @ 12:53), Max: 37.1 (05-17-19 @ 12:53)  HR: 80 (05-17-19 @ 12:53) (68 - 80)  BP: 142/92 (05-17-19 @ 12:53) (122/78 - 142/92)  RR: 18 (05-17-19 @ 12:53) (18 - 18)  SpO2: 97% (05-17-19 @ 12:53) (97% - 100%)  Wt(kg): --  I&O's Summary    17 May 2019 07:01  -  17 May 2019 16:18  --------------------------------------------------------  IN: 1020 mL / OUT: 1200 mL / NET: -180 mL          Appearance: Normal	  HEENT:   Normal oral mucosa, PERRL, EOMI	  Lymphatic: No lymphadenopathy , no edema  Cardiovascular: Normal S1 S2, No JVD, No murmurs , Peripheral pulses palpable 2+ bilaterally  Respiratory: Lungs clear to auscultation, normal effort 	  Gastrointestinal:  Soft, Non-tender, + BS	  Skin: No rashes, No ecchymoses, No cyanosis, warm to touch  Musculoskeletal: Normal range of motion, normal strength  Psychiatry:  Mood & affect appropriate  Ext: No edema      All labs, Imaging and EKGs personally reviewed                           13.3   4.94  )-----------( 176      ( 17 May 2019 06:44 )             42.3               05-17    139  |  101  |  20  ----------------------------<  105<H>  3.9   |  24  |  1.16    Ca    9.3      17 May 2019 06:44  Mg     1.9     05-17      PT/INR - ( 17 May 2019 06:44 )   PT: 14.0 SEC;   INR: 1.22          PTT - ( 17 May 2019 06:44 )  PTT:44.3 SEC          < from: CT Abdomen and Pelvis w/ Oral Cont and w/ IV Cont (05.17.19 @ 12:03) >    IMPRESSION:     No evidence of malignancy in the abdomen or pelvis.
Subjective: Patient seen and examined. No new events except as noted.     SUBJECTIVE/ROS:    feels better     MEDICATIONS:  MEDICATIONS  (STANDING):  aspirin enteric coated 81 milliGRAM(s) Oral daily  atorvastatin 20 milliGRAM(s) Oral at bedtime  carvedilol 3.125 milliGRAM(s) Oral every 12 hours  enoxaparin Injectable 130 milliGRAM(s) SubCutaneous two times a day  ferrous    sulfate 325 milliGRAM(s) Oral daily  furosemide   Injectable 40 milliGRAM(s) IV Push two times a day  lisinopril 10 milliGRAM(s) Oral daily      PHYSICAL EXAM:  T(C): 36.8 (05-16-19 @ 06:16), Max: 37.2 (05-15-19 @ 21:29)  HR: 69 (05-16-19 @ 06:16) (69 - 85)  BP: 137/91 (05-16-19 @ 06:16) (135/87 - 144/73)  RR: 16 (05-16-19 @ 06:16) (16 - 18)  SpO2: 97% (05-16-19 @ 06:16) (97% - 98%)  Wt(kg): --  I&O's Summary          JVP: elevated   Neck: supple  Lung: clear   CV: S1 S2 , Murmur:  Abd: soft  Ext: No edema  neuro: Awake / alert  Psych: flat affect  Skin: normal``    LABS/DATA:    CARDIAC MARKERS:                                12.3   4.36  )-----------( 169      ( 16 May 2019 07:41 )             40.2     05-16    140  |  103  |  14  ----------------------------<  101<H>  3.8   |  24  |  1.06    Ca    9.2      16 May 2019 07:41  Mg     2.2     05-15    TPro  7.9  /  Alb  4.0  /  TBili  0.6  /  DBili  x   /  AST  28  /  ALT  26  /  AlkPhos  52  05-14    proBNP: Serum Pro-Brain Natriuretic Peptide: 2024 pg/mL (05-15 @ 11:24)    Lipid Profile:   HgA1c: Hemoglobin A1C, Whole Blood: 5.9 % (05-15 @ 10:45)    TSH:     TELE:  EKG:    < from: CT Angio Chest w/ IV Cont (05.15.19 @ 09:12) >  Acute bilateral pulmonary emboli with suggestion ofright heart strain.    Dr. Montano discussed these findings with AMANDO Brown on 5/15/2019 9:34   AM, with read back.    5 mm right lower lobe pulmonary nodule. A 6-12 month follow-up chest CT   can be performed for further evaluation as clinically warranted.    < end of copied text >  < from: Transthoracic Echocardiogram (05.15.19 @ 14:16) >  CONCLUSIONS:  1. Normal mitral valve. Minimal mitral regurgitation.  2. Normal left ventricular internal dimensions and wall  thicknesses.  3. Endocardium not well visualized; grossly severe global  left ventricular systolic dysfunction.  4. The right ventricle is not well visualized; grossly  normal right ventricular systolic function.  5. Estimated right ventricular systolic pressure equals 65  mm Hg, assuming right atrial pressure equals 10 mm Hg,  consistent with severe pulmonary hypertension.  *** No previous Echo exam.    < end of copied text >
Subjective: Patient seen and examined. No new events except as noted.     SUBJECTIVE/ROS:    feels better today     MEDICATIONS:  MEDICATIONS  (STANDING):  aspirin enteric coated 81 milliGRAM(s) Oral daily  atorvastatin 20 milliGRAM(s) Oral at bedtime  carvedilol 3.125 milliGRAM(s) Oral every 12 hours  enoxaparin Injectable 130 milliGRAM(s) SubCutaneous two times a day  ferrous    sulfate 325 milliGRAM(s) Oral daily  fluticasone propionate 50 MICROgram(s)/spray Nasal Spray 1 Spray(s) Both Nostrils two times a day  furosemide   Injectable 40 milliGRAM(s) IV Push two times a day  lisinopril 10 milliGRAM(s) Oral daily      PHYSICAL EXAM:  T(C): 36.9 (05-18-19 @ 11:59), Max: 37.1 (05-17-19 @ 12:53)  HR: 80 (05-18-19 @ 11:59) (74 - 80)  BP: 128/80 (05-18-19 @ 11:59) (111/68 - 149/83)  RR: 18 (05-18-19 @ 11:59) (16 - 18)  SpO2: 100% (05-18-19 @ 11:59) (97% - 100%)  Wt(kg): --  I&O's Summary    17 May 2019 07:01  -  18 May 2019 07:00  --------------------------------------------------------  IN: 1408 mL / OUT: 5775 mL / NET: -4367 mL    18 May 2019 07:01  -  18 May 2019 12:25  --------------------------------------------------------  IN: 360 mL / OUT: 700 mL / NET: -340 mL            JVP: Normal  Neck: supple  Lung: clear   CV: S1 S2 , Murmur:  Abd: soft  Ext: No edema  neuro: Awake / alert  Psych: flat affect  Skin: normal``    LABS/DATA:    CARDIAC MARKERS:                                13.2   4.66  )-----------( 167      ( 18 May 2019 01:52 )             41.5     05-18    138  |  98  |  15  ----------------------------<  97  3.8   |  27  |  0.99    Ca    9.2      18 May 2019 01:52  Mg     2.0     05-18      proBNP:   Lipid Profile:   HgA1c:   TSH:     TELE:  EKG:
Subjective: Patient seen and examined. No new events except as noted.     SUBJECTIVE/ROS:  No chest pain, dyspnea, palpitation, or dizziness.       MEDICATIONS:  MEDICATIONS  (STANDING):  aspirin enteric coated 81 milliGRAM(s) Oral daily  atorvastatin 20 milliGRAM(s) Oral at bedtime  carvedilol 3.125 milliGRAM(s) Oral every 12 hours  enoxaparin Injectable 130 milliGRAM(s) SubCutaneous two times a day  ferrous    sulfate 325 milliGRAM(s) Oral daily  furosemide   Injectable 40 milliGRAM(s) IV Push two times a day  lisinopril 10 milliGRAM(s) Oral daily      PHYSICAL EXAM:  T(C): 37.1 (05-17-19 @ 12:53), Max: 37.1 (05-17-19 @ 12:53)  HR: 80 (05-17-19 @ 12:53) (68 - 80)  BP: 142/92 (05-17-19 @ 12:53) (122/78 - 142/92)  RR: 18 (05-17-19 @ 12:53) (18 - 18)  SpO2: 97% (05-17-19 @ 12:53) (97% - 100%)  Wt(kg): --  I&O's Summary    17 May 2019 07:01  -  17 May 2019 16:14  --------------------------------------------------------  IN: 1020 mL / OUT: 1200 mL / NET: -180 mL            JVP: Normal  Neck: supple  Lung: clear   CV: S1 S2 , Murmur:  Abd: soft  Ext: No edema  neuro: Awake / alert  Psych: flat affect  Skin: normal``    LABS/DATA:    CARDIAC MARKERS:                                13.3   4.94  )-----------( 176      ( 17 May 2019 06:44 )             42.3     05-17    139  |  101  |  20  ----------------------------<  105<H>  3.9   |  24  |  1.16    Ca    9.3      17 May 2019 06:44  Mg     1.9     05-17      proBNP:   Lipid Profile:   HgA1c:   TSH:     TELE:  EKG:
Subjective: Patient seen and examined. No new events except as noted.     SUBJECTIVE/ROS:  feels ok      MEDICATIONS:  MEDICATIONS  (STANDING):  apixaban 10 milliGRAM(s) Oral every 12 hours  aspirin enteric coated 81 milliGRAM(s) Oral daily  atorvastatin 20 milliGRAM(s) Oral at bedtime  carvedilol 3.125 milliGRAM(s) Oral every 12 hours  ferrous    sulfate 325 milliGRAM(s) Oral daily  fluticasone propionate 50 MICROgram(s)/spray Nasal Spray 1 Spray(s) Both Nostrils two times a day  furosemide   Injectable 40 milliGRAM(s) IV Push two times a day  lisinopril 10 milliGRAM(s) Oral daily      PHYSICAL EXAM:  T(C): 36.9 (05-19-19 @ 11:25), Max: 36.9 (05-18-19 @ 16:40)  HR: 79 (05-19-19 @ 11:25) (79 - 85)  BP: 145/76 (05-19-19 @ 11:25) (135/84 - 154/90)  RR: 20 (05-19-19 @ 11:25) (16 - 20)  SpO2: 97% (05-19-19 @ 11:25) (97% - 100%)  Wt(kg): --  I&O's Summary    18 May 2019 07:01  -  19 May 2019 07:00  --------------------------------------------------------  IN: 1150 mL / OUT: 3500 mL / NET: -2350 mL    19 May 2019 07:01  -  19 May 2019 12:21  --------------------------------------------------------  IN: 360 mL / OUT: 1300 mL / NET: -940 mL            JVP: Normal  Neck: supple  Lung: clear   CV: S1 S2 , Murmur:  Abd: soft  Ext: No edema  neuro: Awake / alert  Psych: flat affect  Skin: normal``    LABS/DATA:    CARDIAC MARKERS:                                12.9   5.18  )-----------( 173      ( 19 May 2019 06:00 )             40.2     05-19    141  |  101  |  16  ----------------------------<  87  4.2   |  26  |  1.05    Ca    9.4      19 May 2019 06:00  Mg     2.2     05-19      proBNP:   Lipid Profile:   HgA1c:   TSH:     TELE:  EKG:
Subjective: Patient seen and examined. No new events except as noted.   congested       REVIEW OF SYSTEMS:    CONSTITUTIONAL: No weakness, fevers or chills  EYES/ENT: No visual changes;  No vertigo or throat pain   NECK: No pain or stiffness  RESPIRATORY: No cough, wheezing, hemoptysis; No shortness of breath  CARDIOVASCULAR: No chest pain or palpitations  GASTROINTESTINAL: No abdominal or epigastric pain. No nausea, vomiting, or hematemesis; No diarrhea or constipation. No melena or hematochezia.  GENITOURINARY: No dysuria, frequency or hematuria  NEUROLOGICAL: No numbness or weakness  SKIN: No itching, burning, rashes, or lesions   All other review of systems is negative unless indicated above.    MEDICATIONS:  MEDICATIONS  (STANDING):  apixaban 10 milliGRAM(s) Oral every 12 hours  aspirin enteric coated 81 milliGRAM(s) Oral daily  atorvastatin 20 milliGRAM(s) Oral at bedtime  carvedilol 3.125 milliGRAM(s) Oral every 12 hours  ferrous    sulfate 325 milliGRAM(s) Oral daily  fluticasone propionate 50 MICROgram(s)/spray Nasal Spray 1 Spray(s) Both Nostrils two times a day  furosemide   Injectable 40 milliGRAM(s) IV Push two times a day  lisinopril 10 milliGRAM(s) Oral daily      PHYSICAL EXAM:  T(C): 36.9 (05-18-19 @ 11:59), Max: 36.9 (05-18-19 @ 01:27)  HR: 80 (05-18-19 @ 11:59) (74 - 80)  BP: 128/80 (05-18-19 @ 11:59) (111/68 - 149/83)  RR: 18 (05-18-19 @ 11:59) (16 - 18)  SpO2: 100% (05-18-19 @ 11:59) (98% - 100%)  Wt(kg): --  I&O's Summary    17 May 2019 07:01  -  18 May 2019 07:00  --------------------------------------------------------  IN: 1408 mL / OUT: 5775 mL / NET: -4367 mL    18 May 2019 07:01  -  18 May 2019 15:05  --------------------------------------------------------  IN: 540 mL / OUT: 1000 mL / NET: -460 mL          Appearance: Normal, obese 	  HEENT:   Normal oral mucosa, PERRL, EOMI	  Lymphatic: No lymphadenopathy , no edema  Cardiovascular: Normal S1 S2, No JVD, No murmurs , Peripheral pulses palpable 2+ bilaterally  Respiratory: Lungs clear to auscultation, normal effort 	  Gastrointestinal:  Soft, Non-tender, + BS	  Skin: No rashes, No ecchymoses, No cyanosis, warm to touch  Musculoskeletal: Normal range of motion, normal strength  Psychiatry:  Mood & affect appropriate  Ext: No edema      All labs, Imaging and EKGs personally reviewed                           13.2   4.66  )-----------( 167      ( 18 May 2019 01:52 )             41.5               05-18    138  |  98  |  15  ----------------------------<  97  3.8   |  27  |  0.99    Ca    9.2      18 May 2019 01:52  Mg     2.0     05-18      PT/INR - ( 17 May 2019 06:44 )   PT: 14.0 SEC;   INR: 1.22          PTT - ( 17 May 2019 06:44 )  PTT:44.3 SEC
Subjective: Patient seen and examined. No new events except as noted.     SUBJECTIVE/ROS:  feels well       MEDICATIONS:  MEDICATIONS  (STANDING):  apixaban 10 milliGRAM(s) Oral every 12 hours  aspirin enteric coated 81 milliGRAM(s) Oral daily  atorvastatin 20 milliGRAM(s) Oral at bedtime  carvedilol 3.125 milliGRAM(s) Oral every 12 hours  ferrous    sulfate 325 milliGRAM(s) Oral daily  fluticasone propionate 50 MICROgram(s)/spray Nasal Spray 1 Spray(s) Both Nostrils two times a day  furosemide    Tablet 40 milliGRAM(s) Oral two times a day  lisinopril 10 milliGRAM(s) Oral daily      PHYSICAL EXAM:  T(C): 36.6 (05-20-19 @ 04:50), Max: 37.1 (05-19-19 @ 20:57)  HR: 81 (05-20-19 @ 04:50) (78 - 82)  BP: 109/82 (05-20-19 @ 04:50) (109/82 - 145/76)  RR: 18 (05-20-19 @ 04:50) (18 - 20)  SpO2: 100% (05-20-19 @ 04:50) (97% - 100%)  Wt(kg): --  I&O's Summary    19 May 2019 07:01  -  20 May 2019 07:00  --------------------------------------------------------  IN: 1380 mL / OUT: 1900 mL / NET: -520 mL            JVP: Normal  Neck: supple  Lung: clear   CV: S1 S2 , Murmur:  Abd: soft  Ext: No edema  neuro: Awake / alert  Psych: flat affect  Skin: normal``    LABS/DATA:    CARDIAC MARKERS:                                13.4   5.26  )-----------( 177      ( 20 May 2019 07:34 )             42.7     05-20    134<L>  |  98  |  17  ----------------------------<  95  4.0   |  24  |  0.99    Ca    9.4      20 May 2019 07:34  Mg     2.1     05-20      proBNP:   Lipid Profile:   HgA1c:   TSH:     TELE:  EKG:
Subjective: Patient seen and examined. No new events except as noted.   doing well     REVIEW OF SYSTEMS:    CONSTITUTIONAL: No weakness, fevers or chills  EYES/ENT: No visual changes;  No vertigo or throat pain   NECK: No pain or stiffness  RESPIRATORY: No cough, wheezing, hemoptysis; No shortness of breath  CARDIOVASCULAR: No chest pain or palpitations  GASTROINTESTINAL: No abdominal or epigastric pain. No nausea, vomiting, or hematemesis; No diarrhea or constipation. No melena or hematochezia.  GENITOURINARY: No dysuria, frequency or hematuria  NEUROLOGICAL: No numbness or weakness  SKIN: No itching, burning, rashes, or lesions   All other review of systems is negative unless indicated above.    MEDICATIONS:  MEDICATIONS  (STANDING):  apixaban 10 milliGRAM(s) Oral every 12 hours  aspirin enteric coated 81 milliGRAM(s) Oral daily  atorvastatin 20 milliGRAM(s) Oral at bedtime  carvedilol 3.125 milliGRAM(s) Oral every 12 hours  ferrous    sulfate 325 milliGRAM(s) Oral daily  fluticasone propionate 50 MICROgram(s)/spray Nasal Spray 1 Spray(s) Both Nostrils two times a day  furosemide    Tablet 40 milliGRAM(s) Oral two times a day  lisinopril 10 milliGRAM(s) Oral daily      PHYSICAL EXAM:  T(C): 36.9 (05-19-19 @ 11:25), Max: 36.9 (05-18-19 @ 16:40)  HR: 79 (05-19-19 @ 11:25) (79 - 85)  BP: 145/76 (05-19-19 @ 11:25) (135/84 - 154/90)  RR: 20 (05-19-19 @ 11:25) (16 - 20)  SpO2: 97% (05-19-19 @ 11:25) (97% - 100%)  Wt(kg): --  I&O's Summary    18 May 2019 07:01  -  19 May 2019 07:00  --------------------------------------------------------  IN: 1150 mL / OUT: 3500 mL / NET: -2350 mL    19 May 2019 07:01  -  19 May 2019 15:12  --------------------------------------------------------  IN: 360 mL / OUT: 1300 mL / NET: -940 mL          Appearance: Normal	  HEENT:   Normal oral mucosa, PERRL, EOMI	  Lymphatic: No lymphadenopathy , no edema  Cardiovascular: Normal S1 S2, No JVD, No murmurs , Peripheral pulses palpable 2+ bilaterally  Respiratory: Lungs clear to auscultation, normal effort 	  Gastrointestinal:  Soft, Non-tender, + BS	  Skin: No rashes, No ecchymoses, No cyanosis, warm to touch  Musculoskeletal: Normal range of motion, normal strength  Psychiatry:  Mood & affect appropriate  Ext: No edema      All labs, Imaging and EKGs personally reviewed                           12.9   5.18  )-----------( 173      ( 19 May 2019 06:00 )             40.2               05-19    141  |  101  |  16  ----------------------------<  87  4.2   |  26  |  1.05    Ca    9.4      19 May 2019 06:00  Mg     2.2     05-19
Patient is a 54y old  Male who presents with a chief complaint of SOB/Nasal Congestion (20 May 2019 10:59)      Any change in ROS: Ptis doing ok : no SOB : no cough :       MEDICATIONS  (STANDING):  apixaban 10 milliGRAM(s) Oral every 12 hours  aspirin enteric coated 81 milliGRAM(s) Oral daily  atorvastatin 20 milliGRAM(s) Oral at bedtime  carvedilol 3.125 milliGRAM(s) Oral every 12 hours  ferrous    sulfate 325 milliGRAM(s) Oral daily  fluticasone propionate 50 MICROgram(s)/spray Nasal Spray 1 Spray(s) Both Nostrils two times a day  furosemide    Tablet 40 milliGRAM(s) Oral two times a day  lisinopril 10 milliGRAM(s) Oral daily    MEDICATIONS  (PRN):  acetaminophen   Tablet .. 650 milliGRAM(s) Oral every 6 hours PRN Temp greater or equal to 38C (100.4F), Mild Pain (1 - 3), Moderate Pain (4 - 6)    Vital Signs Last 24 Hrs  T(C): 36.6 (20 May 2019 04:50), Max: 37.1 (19 May 2019 20:57)  T(F): 97.9 (20 May 2019 04:50), Max: 98.7 (19 May 2019 20:57)  HR: 81 (20 May 2019 04:50) (78 - 82)  BP: 109/82 (20 May 2019 04:50) (109/82 - 144/98)  BP(mean): --  RR: 18 (20 May 2019 04:50) (18 - 18)  SpO2: 100% (20 May 2019 04:50) (98% - 100%)    I&O's Summary    19 May 2019 07:01  -  20 May 2019 07:00  --------------------------------------------------------  IN: 1380 mL / OUT: 1900 mL / NET: -520 mL          Physical Exam:   GENERAL: Obese+  HEENT: FÁTIMA/   Atraumatic, Normocephalic  ENMT: No tonsillar erythema, exudates, or enlargement; Moist mucous membranes, Good dentition, No lesions  NECK: Supple, No JVD, Normal thyroid  CHEST/LUNG: Clear to auscultaion  CVS: Regular rate and rhythm; No murmurs, rubs, or gallops  GI: : Soft, Nontender, Nondistended; Bowel sounds present  NERVOUS SYSTEM:  Alert & Oriented X3  EXTREMITIES:  2+ Peripheral Pulses, No clubbing, cyanosis, or edema  LYMPH: No lymphadenopathy noted  SKIN: No rashes or lesions  ENDOCRINOLOGY: No Thyromegaly  PSYCH: Appropriate    Labs:  26                            13.4   5.26  )-----------( 177      ( 20 May 2019 07:34 )             42.7                         12.9   5.18  )-----------( 173      ( 19 May 2019 06:00 )             40.2                         13.2   4.66  )-----------( 167      ( 18 May 2019 01:52 )             41.5                         13.3   4.94  )-----------( 176      ( 17 May 2019 06:44 )             42.3     05-20    134<L>  |  98  |  17  ----------------------------<  95  4.0   |  24  |  0.99  05-19    141  |  101  |  16  ----------------------------<  87  4.2   |  26  |  1.05  05-18    138  |  98  |  15  ----------------------------<  97  3.8   |  27  |  0.99  05-17    139  |  101  |  20  ----------------------------<  105<H>  3.9   |  24  |  1.16    Ca    9.4      20 May 2019 07:34  Ca    9.4      19 May 2019 06:00  Mg     2.1     05-20  Mg     2.2     05-19      CAPILLARY BLOOD GLUCOSE        < from: CT Abdomen and Pelvis w/ Oral Cont and w/ IV Cont (05.17.19 @ 12:03) >    FINDINGS:    LOWER CHEST: Within normal limits.    LIVER: Within normal limits.  BILE DUCTS: Normal caliber.  GALLBLADDER: Within normal limits.  SPLEEN: Within normal limits.  PANCREAS: Within normal limits.  ADRENALS: Within normal limits.  KIDNEYS/URETERS: Within normal limits.    BLADDER: Within normal limits.  REPRODUCTIVE ORGANS: Prostate is enlarged.    BOWEL: No bowel obstruction. Appendix is normal.  PERITONEUM: No ascites.  VESSELS:  Retroaortic left renal vein. Scattered atherosclerotic   calcifications.  RETROPERITONEUM: No lymphadenopathy.    ABDOMINAL WALL: Bilateral fat-containing inguinal hernias.  BONES: Degenerative changes of the spine. Severe left hip arthroplasty.    IMPRESSION:     No evidence of malignancy in the abdomen or pelvis.          < from: CT Angio Chest w/ IV Cont (05.15.19 @ 09:12) >  Mediastinum: No mediastinal lymphadenopathy. The imaged portions of the   thyroid gland is unremarkable.    Heart and Vasculature:  No pericardial effusion. There is subtle   flattening of the interventricular septum with some prominence of the   right heart chambers concerning for right heart strain.    Upper Abdomen: Small Bochdalek and hiatal hernia.    Bones And Soft Tissues: Degenerative change of the spine.    IMPRESSION:     Acute bilateral pulmonary emboli with suggestion ofright heart strain.    Dr. Montano discussed these findings with AMANDO Brown on 5/15/2019 9:34   AM, with read back.    5 mm right lower lobe pulmonary nodule. A 6-12 month follow-up chest CT   can be performed for further evaluation as clinically warranted.              OC MONTANO M.D., RADIOLOGY RESIDENT  This document has been electronically signed.  BERTO GARCIA M.D. ATTENDING RADIOLOGIST    < end of copied text >          ARTURO CARLTON M.D., ATTENDING RADIOLOGIST  This document has been electronically signed. May 17 2019  2:26PM        < end of copied text >                RECENT CULTURES:        RESPIRATORY CULTURES:          Studies  Chest X-RAY  CT SCAN Chest   Venous Dopplers: LE:   CT Abdomen  Others
Yes

## 2020-12-16 PROBLEM — Z12.11 ENCOUNTER FOR SCREENING COLONOSCOPY: Status: RESOLVED | Noted: 2018-08-16 | Resolved: 2020-12-16

## 2022-05-25 NOTE — CHART NOTE - NSCHARTNOTESELECT_GEN_ALL_CORE
Event Note Purse String (Simple) Text: Given the location of the defect and the characteristics of the surrounding skin a purse string simple closure was deemed most appropriate.  Undermining was performed circumferentially around the surgical defect.  A purse string suture was then placed and tightened.